# Patient Record
Sex: FEMALE | Race: WHITE | NOT HISPANIC OR LATINO
[De-identification: names, ages, dates, MRNs, and addresses within clinical notes are randomized per-mention and may not be internally consistent; named-entity substitution may affect disease eponyms.]

---

## 2017-03-06 ENCOUNTER — APPOINTMENT (OUTPATIENT)
Dept: INTERNAL MEDICINE | Facility: CLINIC | Age: 62
End: 2017-03-06

## 2017-03-06 VITALS
OXYGEN SATURATION: 97 % | HEIGHT: 60 IN | DIASTOLIC BLOOD PRESSURE: 80 MMHG | TEMPERATURE: 98.3 F | SYSTOLIC BLOOD PRESSURE: 150 MMHG | RESPIRATION RATE: 14 BRPM | HEART RATE: 118 BPM

## 2017-03-06 DIAGNOSIS — H92.02 OTALGIA, LEFT EAR: ICD-10-CM

## 2017-03-06 DIAGNOSIS — R59.1 GENERALIZED ENLARGED LYMPH NODES: ICD-10-CM

## 2017-03-06 DIAGNOSIS — M54.2 CERVICALGIA: ICD-10-CM

## 2017-03-12 LAB
ALBUMIN SERPL ELPH-MCNC: 4.3 G/DL
ALP BLD-CCNC: 74 U/L
ALT SERPL-CCNC: 35 U/L
ANION GAP SERPL CALC-SCNC: 14 MMOL/L
AST SERPL-CCNC: 25 U/L
BASOPHILS # BLD AUTO: 0.05 K/UL
BASOPHILS NFR BLD AUTO: 0.6 %
BILIRUB SERPL-MCNC: 0.3 MG/DL
BUN SERPL-MCNC: 21 MG/DL
CALCIUM SERPL-MCNC: 9.6 MG/DL
CHLORIDE SERPL-SCNC: 98 MMOL/L
CO2 SERPL-SCNC: 25 MMOL/L
CREAT SERPL-MCNC: 0.89 MG/DL
CRP SERPL-MCNC: 0.46 MG/DL
EOSINOPHIL # BLD AUTO: 0.31 K/UL
EOSINOPHIL NFR BLD AUTO: 3.9 %
GLUCOSE SERPL-MCNC: 106 MG/DL
HCT VFR BLD CALC: 40.5 %
HGB BLD-MCNC: 13.8 G/DL
IMM GRANULOCYTES NFR BLD AUTO: 0.1 %
LYMPHOCYTES # BLD AUTO: 2.38 K/UL
LYMPHOCYTES NFR BLD AUTO: 30.2 %
MAN DIFF?: NORMAL
MCHC RBC-ENTMCNC: 33.7 PG
MCHC RBC-ENTMCNC: 34.1 GM/DL
MCV RBC AUTO: 98.8 FL
MONOCYTES # BLD AUTO: 0.57 K/UL
MONOCYTES NFR BLD AUTO: 7.2 %
NEUTROPHILS # BLD AUTO: 4.55 K/UL
NEUTROPHILS NFR BLD AUTO: 58 %
PLATELET # BLD AUTO: 255 K/UL
POTASSIUM SERPL-SCNC: 4.5 MMOL/L
PROT SERPL-MCNC: 7 G/DL
RBC # BLD: 4.1 M/UL
RBC # FLD: 13.4 %
SODIUM SERPL-SCNC: 137 MMOL/L
THYROGLOB AB SERPL-ACNC: <20 IU/ML
THYROPEROXIDASE AB SERPL IA-ACNC: <10 IU/ML
TSH SERPL-ACNC: 2.27 UIU/ML
WBC # FLD AUTO: 7.87 K/UL

## 2017-07-24 ENCOUNTER — INPATIENT (INPATIENT)
Facility: HOSPITAL | Age: 62
LOS: 4 days | Discharge: HOME | DRG: 26 | End: 2017-07-29
Attending: NEUROLOGICAL SURGERY | Admitting: NEUROLOGICAL SURGERY
Payer: COMMERCIAL

## 2017-07-24 ENCOUNTER — EMERGENCY (EMERGENCY)
Facility: HOSPITAL | Age: 62
LOS: 1 days | Discharge: ANOTHER HOSPITAL | End: 2017-07-24
Attending: EMERGENCY MEDICINE | Admitting: EMERGENCY MEDICINE
Payer: COMMERCIAL

## 2017-07-24 ENCOUNTER — APPOINTMENT (OUTPATIENT)
Dept: INTERNAL MEDICINE | Facility: CLINIC | Age: 62
End: 2017-07-24

## 2017-07-24 ENCOUNTER — NON-APPOINTMENT (OUTPATIENT)
Age: 62
End: 2017-07-24

## 2017-07-24 VITALS
OXYGEN SATURATION: 98 % | DIASTOLIC BLOOD PRESSURE: 86 MMHG | SYSTOLIC BLOOD PRESSURE: 138 MMHG | HEART RATE: 82 BPM | HEIGHT: 60 IN | BODY MASS INDEX: 36.32 KG/M2 | WEIGHT: 185 LBS

## 2017-07-24 VITALS
OXYGEN SATURATION: 98 % | DIASTOLIC BLOOD PRESSURE: 91 MMHG | HEART RATE: 70 BPM | SYSTOLIC BLOOD PRESSURE: 173 MMHG | TEMPERATURE: 99 F | RESPIRATION RATE: 18 BRPM

## 2017-07-24 VITALS
SYSTOLIC BLOOD PRESSURE: 180 MMHG | RESPIRATION RATE: 16 BRPM | DIASTOLIC BLOOD PRESSURE: 73 MMHG | WEIGHT: 184.97 LBS | TEMPERATURE: 98 F | OXYGEN SATURATION: 96 % | HEART RATE: 71 BPM

## 2017-07-24 VITALS
DIASTOLIC BLOOD PRESSURE: 97 MMHG | OXYGEN SATURATION: 97 % | RESPIRATION RATE: 20 BRPM | TEMPERATURE: 98 F | HEART RATE: 77 BPM | SYSTOLIC BLOOD PRESSURE: 143 MMHG

## 2017-07-24 DIAGNOSIS — G93.9 DISORDER OF BRAIN, UNSPECIFIED: ICD-10-CM

## 2017-07-24 DIAGNOSIS — R41.0 DISORIENTATION, UNSPECIFIED: ICD-10-CM

## 2017-07-24 DIAGNOSIS — J45.909 UNSPECIFIED ASTHMA, UNCOMPLICATED: ICD-10-CM

## 2017-07-24 DIAGNOSIS — Z98.891 HISTORY OF UTERINE SCAR FROM PREVIOUS SURGERY: Chronic | ICD-10-CM

## 2017-07-24 DIAGNOSIS — F32.9 MAJOR DEPRESSIVE DISORDER, SINGLE EPISODE, UNSPECIFIED: ICD-10-CM

## 2017-07-24 DIAGNOSIS — R26.0 ATAXIC GAIT: ICD-10-CM

## 2017-07-24 DIAGNOSIS — E66.9 OBESITY, UNSPECIFIED: ICD-10-CM

## 2017-07-24 DIAGNOSIS — R26.89 OTHER ABNORMALITIES OF GAIT AND MOBILITY: ICD-10-CM

## 2017-07-24 DIAGNOSIS — Z87.891 PERSONAL HISTORY OF NICOTINE DEPENDENCE: ICD-10-CM

## 2017-07-24 LAB
ALBUMIN SERPL ELPH-MCNC: 3.9 G/DL — SIGNIFICANT CHANGE UP (ref 3.3–5)
ALBUMIN SERPL ELPH-MCNC: 4.3 G/DL — SIGNIFICANT CHANGE UP (ref 3.3–5)
ALP SERPL-CCNC: 73 U/L — SIGNIFICANT CHANGE UP (ref 40–120)
ALP SERPL-CCNC: 91 U/L — SIGNIFICANT CHANGE UP (ref 40–120)
ALT FLD-CCNC: 32 U/L RC — SIGNIFICANT CHANGE UP (ref 10–45)
ALT FLD-CCNC: 37 U/L — SIGNIFICANT CHANGE UP (ref 12–78)
ANION GAP SERPL CALC-SCNC: 18 MMOL/L — HIGH (ref 5–17)
ANION GAP SERPL CALC-SCNC: 5 MMOL/L — SIGNIFICANT CHANGE UP (ref 5–17)
APTT BLD: 27.4 SEC — LOW (ref 27.5–37.4)
APTT BLD: 28.4 SEC — SIGNIFICANT CHANGE UP (ref 27.5–37.4)
AST SERPL-CCNC: 20 U/L — SIGNIFICANT CHANGE UP (ref 15–37)
AST SERPL-CCNC: 21 U/L — SIGNIFICANT CHANGE UP (ref 10–40)
BASOPHILS # BLD AUTO: 0 K/UL — SIGNIFICANT CHANGE UP (ref 0–0.2)
BASOPHILS # BLD AUTO: 0.1 K/UL — SIGNIFICANT CHANGE UP (ref 0–0.2)
BASOPHILS NFR BLD AUTO: 0.5 % — SIGNIFICANT CHANGE UP (ref 0–2)
BASOPHILS NFR BLD AUTO: 1.4 % — SIGNIFICANT CHANGE UP (ref 0–2)
BILIRUB SERPL-MCNC: 0.3 MG/DL — SIGNIFICANT CHANGE UP (ref 0.2–1.2)
BILIRUB SERPL-MCNC: 0.4 MG/DL — SIGNIFICANT CHANGE UP (ref 0.2–1.2)
BLD GP AB SCN SERPL QL: NEGATIVE — SIGNIFICANT CHANGE UP
BLD GP AB SCN SERPL QL: SIGNIFICANT CHANGE UP
BUN SERPL-MCNC: 20 MG/DL — SIGNIFICANT CHANGE UP (ref 7–23)
BUN SERPL-MCNC: 23 MG/DL — SIGNIFICANT CHANGE UP (ref 7–23)
CALCIUM SERPL-MCNC: 8.6 MG/DL — SIGNIFICANT CHANGE UP (ref 8.5–10.1)
CALCIUM SERPL-MCNC: 9.3 MG/DL — SIGNIFICANT CHANGE UP (ref 8.4–10.5)
CHLORIDE SERPL-SCNC: 103 MMOL/L — SIGNIFICANT CHANGE UP (ref 96–108)
CHLORIDE SERPL-SCNC: 99 MMOL/L — SIGNIFICANT CHANGE UP (ref 96–108)
CO2 SERPL-SCNC: 22 MMOL/L — SIGNIFICANT CHANGE UP (ref 22–31)
CO2 SERPL-SCNC: 29 MMOL/L — SIGNIFICANT CHANGE UP (ref 22–31)
CREAT SERPL-MCNC: 0.59 MG/DL — SIGNIFICANT CHANGE UP (ref 0.5–1.3)
CREAT SERPL-MCNC: 0.6 MG/DL — SIGNIFICANT CHANGE UP (ref 0.5–1.3)
EOSINOPHIL # BLD AUTO: 0.2 K/UL — SIGNIFICANT CHANGE UP (ref 0–0.5)
EOSINOPHIL # BLD AUTO: 0.2 K/UL — SIGNIFICANT CHANGE UP (ref 0–0.5)
EOSINOPHIL NFR BLD AUTO: 1.9 % — SIGNIFICANT CHANGE UP (ref 0–6)
EOSINOPHIL NFR BLD AUTO: 3.3 % — SIGNIFICANT CHANGE UP (ref 0–6)
GLUCOSE SERPL-MCNC: 121 MG/DL — HIGH (ref 70–99)
GLUCOSE SERPL-MCNC: 96 MG/DL — SIGNIFICANT CHANGE UP (ref 70–99)
HCT VFR BLD CALC: 39.5 % — SIGNIFICANT CHANGE UP (ref 34.5–45)
HCT VFR BLD CALC: 39.7 % — SIGNIFICANT CHANGE UP (ref 34.5–45)
HGB BLD-MCNC: 13.6 G/DL — SIGNIFICANT CHANGE UP (ref 11.5–15.5)
HGB BLD-MCNC: 13.6 G/DL — SIGNIFICANT CHANGE UP (ref 11.5–15.5)
INR BLD: 0.99 RATIO — SIGNIFICANT CHANGE UP (ref 0.88–1.16)
INR BLD: 1 RATIO — SIGNIFICANT CHANGE UP (ref 0.88–1.16)
LYMPHOCYTES # BLD AUTO: 1.1 K/UL — SIGNIFICANT CHANGE UP (ref 1–3.3)
LYMPHOCYTES # BLD AUTO: 12.4 % — LOW (ref 13–44)
LYMPHOCYTES # BLD AUTO: 2.1 K/UL — SIGNIFICANT CHANGE UP (ref 1–3.3)
LYMPHOCYTES # BLD AUTO: 27.9 % — SIGNIFICANT CHANGE UP (ref 13–44)
MAGNESIUM SERPL-MCNC: 2.2 MG/DL — SIGNIFICANT CHANGE UP (ref 1.6–2.6)
MCHC RBC-ENTMCNC: 34.2 GM/DL — SIGNIFICANT CHANGE UP (ref 32–36)
MCHC RBC-ENTMCNC: 34.3 PG — HIGH (ref 27–34)
MCHC RBC-ENTMCNC: 34.5 GM/DL — SIGNIFICANT CHANGE UP (ref 32–36)
MCHC RBC-ENTMCNC: 35.1 PG — HIGH (ref 27–34)
MCV RBC AUTO: 100.4 FL — HIGH (ref 80–100)
MCV RBC AUTO: 102 FL — HIGH (ref 80–100)
MONOCYTES # BLD AUTO: 0.3 K/UL — SIGNIFICANT CHANGE UP (ref 0–0.9)
MONOCYTES # BLD AUTO: 0.6 K/UL — SIGNIFICANT CHANGE UP (ref 0–0.9)
MONOCYTES NFR BLD AUTO: 3.1 % — SIGNIFICANT CHANGE UP (ref 2–14)
MONOCYTES NFR BLD AUTO: 7.5 % — SIGNIFICANT CHANGE UP (ref 1–9)
NEUTROPHILS # BLD AUTO: 4.4 K/UL — SIGNIFICANT CHANGE UP (ref 1.8–7.4)
NEUTROPHILS # BLD AUTO: 7.5 K/UL — HIGH (ref 1.8–7.4)
NEUTROPHILS NFR BLD AUTO: 59.8 % — SIGNIFICANT CHANGE UP (ref 43–77)
NEUTROPHILS NFR BLD AUTO: 82.1 % — HIGH (ref 43–77)
PHOSPHATE SERPL-MCNC: 4 MG/DL — SIGNIFICANT CHANGE UP (ref 2.5–4.5)
PLATELET # BLD AUTO: 236 K/UL — SIGNIFICANT CHANGE UP (ref 150–400)
PLATELET # BLD AUTO: 238 K/UL — SIGNIFICANT CHANGE UP (ref 150–400)
POTASSIUM SERPL-MCNC: 3.9 MMOL/L — SIGNIFICANT CHANGE UP (ref 3.5–5.3)
POTASSIUM SERPL-MCNC: 4 MMOL/L — SIGNIFICANT CHANGE UP (ref 3.5–5.3)
POTASSIUM SERPL-SCNC: 3.9 MMOL/L — SIGNIFICANT CHANGE UP (ref 3.5–5.3)
POTASSIUM SERPL-SCNC: 4 MMOL/L — SIGNIFICANT CHANGE UP (ref 3.5–5.3)
PROT SERPL-MCNC: 7.2 G/DL — SIGNIFICANT CHANGE UP (ref 6–8.3)
PROT SERPL-MCNC: 7.3 G/DL — SIGNIFICANT CHANGE UP (ref 6–8.3)
PROTHROM AB SERPL-ACNC: 10.8 SEC — SIGNIFICANT CHANGE UP (ref 9.8–12.7)
PROTHROM AB SERPL-ACNC: 10.9 SEC — SIGNIFICANT CHANGE UP (ref 9.8–12.7)
RBC # BLD: 3.88 M/UL — SIGNIFICANT CHANGE UP (ref 3.8–5.2)
RBC # BLD: 3.95 M/UL — SIGNIFICANT CHANGE UP (ref 3.8–5.2)
RBC # FLD: 11.9 % — SIGNIFICANT CHANGE UP (ref 10.3–14.5)
RBC # FLD: 12.1 % — SIGNIFICANT CHANGE UP (ref 10.3–14.5)
RH IG SCN BLD-IMP: POSITIVE — SIGNIFICANT CHANGE UP
RH IG SCN BLD-IMP: POSITIVE — SIGNIFICANT CHANGE UP
SODIUM SERPL-SCNC: 137 MMOL/L — SIGNIFICANT CHANGE UP (ref 135–145)
SODIUM SERPL-SCNC: 139 MMOL/L — SIGNIFICANT CHANGE UP (ref 135–145)
TROPONIN I SERPL-MCNC: <.015 NG/ML — SIGNIFICANT CHANGE UP (ref 0.01–0.04)
TSH SERPL-MCNC: 3.37 UIU/ML — SIGNIFICANT CHANGE UP (ref 0.36–3.74)
WBC # BLD: 7.4 K/UL — SIGNIFICANT CHANGE UP (ref 3.8–10.5)
WBC # BLD: 9.1 K/UL — SIGNIFICANT CHANGE UP (ref 3.8–10.5)
WBC # FLD AUTO: 7.4 K/UL — SIGNIFICANT CHANGE UP (ref 3.8–10.5)
WBC # FLD AUTO: 9.1 K/UL — SIGNIFICANT CHANGE UP (ref 3.8–10.5)

## 2017-07-24 PROCEDURE — 70450 CT HEAD/BRAIN W/O DYE: CPT | Mod: 26

## 2017-07-24 PROCEDURE — 93005 ELECTROCARDIOGRAM TRACING: CPT

## 2017-07-24 PROCEDURE — 93010 ELECTROCARDIOGRAM REPORT: CPT | Mod: 77

## 2017-07-24 PROCEDURE — 85610 PROTHROMBIN TIME: CPT

## 2017-07-24 PROCEDURE — 96375 TX/PRO/DX INJ NEW DRUG ADDON: CPT

## 2017-07-24 PROCEDURE — 74177 CT ABD & PELVIS W/CONTRAST: CPT | Mod: 26

## 2017-07-24 PROCEDURE — 85730 THROMBOPLASTIN TIME PARTIAL: CPT

## 2017-07-24 PROCEDURE — 86901 BLOOD TYPING SEROLOGIC RH(D): CPT

## 2017-07-24 PROCEDURE — 86900 BLOOD TYPING SEROLOGIC ABO: CPT

## 2017-07-24 PROCEDURE — 84484 ASSAY OF TROPONIN QUANT: CPT

## 2017-07-24 PROCEDURE — 71260 CT THORAX DX C+: CPT | Mod: 26

## 2017-07-24 PROCEDURE — 70450 CT HEAD/BRAIN W/O DYE: CPT

## 2017-07-24 PROCEDURE — 80053 COMPREHEN METABOLIC PANEL: CPT

## 2017-07-24 PROCEDURE — 36415 COLL VENOUS BLD VENIPUNCTURE: CPT

## 2017-07-24 PROCEDURE — 93010 ELECTROCARDIOGRAM REPORT: CPT | Mod: 59

## 2017-07-24 PROCEDURE — 84443 ASSAY THYROID STIM HORMONE: CPT

## 2017-07-24 PROCEDURE — 85027 COMPLETE CBC AUTOMATED: CPT

## 2017-07-24 PROCEDURE — 84100 ASSAY OF PHOSPHORUS: CPT

## 2017-07-24 PROCEDURE — 83735 ASSAY OF MAGNESIUM: CPT

## 2017-07-24 PROCEDURE — 99285 EMERGENCY DEPT VISIT HI MDM: CPT

## 2017-07-24 PROCEDURE — 96374 THER/PROPH/DIAG INJ IV PUSH: CPT

## 2017-07-24 PROCEDURE — 99285 EMERGENCY DEPT VISIT HI MDM: CPT | Mod: 25

## 2017-07-24 PROCEDURE — 86850 RBC ANTIBODY SCREEN: CPT

## 2017-07-24 RX ORDER — LEVETIRACETAM 250 MG/1
500 TABLET, FILM COATED ORAL ONCE
Qty: 0 | Refills: 0 | Status: COMPLETED | OUTPATIENT
Start: 2017-07-24 | End: 2017-07-24

## 2017-07-24 RX ORDER — DEXAMETHASONE 0.5 MG/5ML
10 ELIXIR ORAL ONCE
Qty: 0 | Refills: 0 | Status: COMPLETED | OUTPATIENT
Start: 2017-07-24 | End: 2017-07-24

## 2017-07-24 RX ORDER — ACETAMINOPHEN 500 MG
650 TABLET ORAL EVERY 6 HOURS
Qty: 0 | Refills: 0 | Status: DISCONTINUED | OUTPATIENT
Start: 2017-07-24 | End: 2017-07-29

## 2017-07-24 RX ORDER — LEVETIRACETAM 250 MG/1
500 TABLET, FILM COATED ORAL EVERY 12 HOURS
Qty: 0 | Refills: 0 | Status: DISCONTINUED | OUTPATIENT
Start: 2017-07-24 | End: 2017-07-27

## 2017-07-24 RX ORDER — FLUOCINOLONE ACETONIDE 0.1 MG/G
0.01 CREAM TOPICAL
Qty: 60 | Refills: 0 | Status: DISCONTINUED | COMMUNITY
Start: 2017-02-01 | End: 2017-07-24

## 2017-07-24 RX ORDER — AMOXICILLIN AND CLAVULANATE POTASSIUM 875; 125 MG/1; MG/1
875-125 TABLET, COATED ORAL
Qty: 20 | Refills: 0 | Status: DISCONTINUED | COMMUNITY
Start: 2017-03-06 | End: 2017-07-24

## 2017-07-24 RX ORDER — HYDROCODONE BITARTRATE AND ACETAMINOPHEN 7.5; 3 MG/1; MG/1
7.5-3 TABLET ORAL EVERY 6 HOURS
Qty: 12 | Refills: 0 | Status: DISCONTINUED | COMMUNITY
Start: 2017-03-06 | End: 2017-07-24

## 2017-07-24 RX ORDER — FLUOXETINE HCL 10 MG
10 CAPSULE ORAL DAILY
Qty: 0 | Refills: 0 | Status: DISCONTINUED | OUTPATIENT
Start: 2017-07-24 | End: 2017-07-26

## 2017-07-24 RX ORDER — ONDANSETRON 8 MG/1
4 TABLET, FILM COATED ORAL EVERY 6 HOURS
Qty: 0 | Refills: 0 | Status: DISCONTINUED | OUTPATIENT
Start: 2017-07-24 | End: 2017-07-27

## 2017-07-24 RX ORDER — SODIUM SULFATE, POTASSIUM SULFATE, MAGNESIUM SULFATE 17.5; 3.13; 1.6 G/ML; G/ML; G/ML
17.5-3.13-1.6 SOLUTION, CONCENTRATE ORAL
Qty: 354 | Refills: 0 | Status: DISCONTINUED | COMMUNITY
Start: 2017-05-02

## 2017-07-24 RX ORDER — DOCUSATE SODIUM 100 MG
100 CAPSULE ORAL THREE TIMES A DAY
Qty: 0 | Refills: 0 | Status: DISCONTINUED | OUTPATIENT
Start: 2017-07-24 | End: 2017-07-27

## 2017-07-24 RX ORDER — BUDESONIDE AND FORMOTEROL FUMARATE DIHYDRATE 160; 4.5 UG/1; UG/1
2 AEROSOL RESPIRATORY (INHALATION)
Qty: 0 | Refills: 0 | Status: DISCONTINUED | OUTPATIENT
Start: 2017-07-24 | End: 2017-07-27

## 2017-07-24 RX ORDER — SENNA PLUS 8.6 MG/1
2 TABLET ORAL AT BEDTIME
Qty: 0 | Refills: 0 | Status: DISCONTINUED | OUTPATIENT
Start: 2017-07-24 | End: 2017-07-29

## 2017-07-24 RX ADMIN — Medication 102 MILLIGRAM(S): at 20:20

## 2017-07-24 RX ADMIN — LEVETIRACETAM 420 MILLIGRAM(S): 250 TABLET, FILM COATED ORAL at 20:30

## 2017-07-24 NOTE — H&P ADULT - NSHPPHYSICALEXAM_GEN_ALL_CORE
AOx3, FC, PERRL, EOMI, V1-3 intact, no facial, palate jose symmetric, tongue midline, shrug 5/5  5/5 throughout, no drift  SILT  No clonus or babinski   Mild left dysmetria, bilateral coarse tremor  Stereognosis intact  No left-right confusion  No agraphia or alexia  no finger agnosia

## 2017-07-24 NOTE — ED PROVIDER NOTE - OBJECTIVE STATEMENT
60yo F with 6 weeks of trouble with depth perception, taking steps that arent there, trouble with keyboard, feeling off balance. no weakness. no vision changes. no HA. no fever/chills. saw PCP today who sent to ED b/c heart rate was irregular. at [place] found to have brain mass vs abscess. no h/o travel, no spinal procedures.     PMH - asthma

## 2017-07-24 NOTE — ED ADULT NURSE NOTE - OBJECTIVE STATEMENT
Pt is 6 weeks S/P colonoscopy. Pt reports vague neuro complaints including issues with depth perception, hand-eye coordination, forgetfulness & balance which has not worsened but also has not improved. Pt reports symptoms began after procedure, is concerned they may be R/T anesthesia

## 2017-07-24 NOTE — ED PROVIDER NOTE - ATTENDING CONTRIBUTION TO CARE
ATTENDING MD THRASHER.  Agree with above.  Pt is a 61 yr old female who presented to outpt office today with complaint of increasing spacial sxs concerning for intracranial mass.  Seen at [facility] and found to have intracranial mass of unclear etiology.  Hx of tobacco use x 20 yrs quit in 1993.  No other CA known.  Pt is well appearing and otherwise asymptomatic.  Hx of colonoscopy ~10 wks ago without acutely actionable findings.  Planned admission to [facility] for continued management and further imaging.  Stable for admission to [facility].

## 2017-07-24 NOTE — H&P ADULT - HISTORY OF PRESENT ILLNESS
61 RHF with no history of cancer p/w "spatial disorientation," "difficulty walking" and forgetting basic tasks like typing, progressive over 6 weeks. She went to  where CTH shows approximately 3x4cm right parietal cystic lesion. She was given 10mg decadron and 500 mg Keppra and transferred to [facility] for further evaluation. On arrival, patient states that over the past several weeks she has experienced forgetting how to fold clothes and type on the keyboard at times, causing her some concern. She states that her short term memory has been progressively worse and she has developed bilateral tremor for the past year. She denies fevers, chills, weight loss/gain, history of cancer, weakness, numbness, tingling, seizures.

## 2017-07-24 NOTE — ED PROVIDER NOTE - MEDICAL DECISION MAKING DETAILS
Brain mass vs abscess, no infectious sx or risk factor for abscess, no distress, receivedf 10mg decadron and 500mg keppra already. will repeat labs, CT C/A/P, MRI brain TBA

## 2017-07-24 NOTE — H&P ADULT - ATTENDING COMMENTS
Agree with resident note. Patient personally seen and examined. All current imaging studies reviewed.  MRI c/w a large R parietal cystic lesion abutted the dura, the solid component enhances.  CT C/A/P: neg for ??metastatic disease  Pt is a/a/o and has minimal neuro deficit.  Plan : R craniotomy. discussed at length with pt and separately with . Will obtain medical clearance.  BILLY

## 2017-07-24 NOTE — ED PROVIDER NOTE - CHPI ED SYMPTOMS NEG
no numbness/no fever/no change in level of consciousness/no vomiting/no blurred vision/no confusion/no dizziness/no nausea/no weakness/no loss of consciousness

## 2017-07-24 NOTE — ED PROVIDER NOTE - PROGRESS NOTE DETAILS
cardiology dr monroy to [facility] dr cordova paged re neuro dr cordova paged re neuro  in the meantime dr Peraza paged pedro Warren, if ct is neg ok to dc as sx chronic needs out patient workup radiology called r pareital with mass effect and edema 3.8 cm? abscess vs neoplasm  [facility] transfer center called-  DR Crews is accepting neurosurgeon  er attending dr cope radiology called r pareital with mass effect and edema 3.8 cm? abscess vs neoplasm  [facility] transfer center called-  DR Crews is accepting neurosurgeon  er attending dr Brooks

## 2017-07-24 NOTE — ED PROVIDER NOTE - CHIEF COMPLAINT
The patient is a 61y Female complaining of see chief complaint quote. The patient is a 61y Female complaining of being unsteady

## 2017-07-24 NOTE — ED PROVIDER NOTE - NS ED ROS FT
ROS: no CP/SOB. no cough. no n/v/d/c. no abd pain. no rash. no bleeding. no urinary complaints. no weakness. no vision changes. no HA. no neck/back pain. no extremity swelling.

## 2017-07-24 NOTE — ED PROVIDER NOTE - PHYSICAL EXAMINATION
Gen: NAD, AOx3  Head: NCAT  HEENT: PERRL, oral mucosa moist, normal conjunctiva, neck supple  Lung: CTAB, no respiratory distress  CV: rrr, no murmur, Normal perfusion  Abd: soft, NTND, obese  MSK: No edema, no visible deformities  Neuro: +dysmetria on Lt UE, CN II-XII intact, no focal weakness or sensory deficits  Skin: No rash   Psych: normal affect

## 2017-07-24 NOTE — ED PROVIDER NOTE - OBJECTIVE STATEMENT
Pt is a 60 yo f who has who has hx of asthma sp l4/5 lami sp bl shoulder surgery sp r cyst excision of the wrist and c section.  she was in her usual state of health until 6 weeks ago, approx 1 week after she underwent a screening colonoscopy on 6/8.  At that time she began to have problems with coordination, bumping into things depth perception.  she thought sx would get better but did not so she saw pmd today who sent her to er for eval and cardiology consultation with slightly irregular ekg  pmd dr cordova  former smoker no drug allergies

## 2017-07-24 NOTE — ED ADULT NURSE NOTE - OBJECTIVE STATEMENT
Patient was transferred by EMS from [place]. Patient had six weeks of unsteadiness and difficulty ambulating. Diagnosed with a brain mass transferred for neurosurgery consult.

## 2017-07-24 NOTE — CONSULT NOTE ADULT - SUBJECTIVE AND OBJECTIVE BOX
[facility] - Pamela King, Nish, Damian, Sharath Casillas  Office Number: [phone]    Initial Consult Note    CHIEF COMPLAINT: Patient is a 61y old  Female who presents with a chief complaint of an abnormal EKG    HPI:  This is a 61 year old woman with a history of obesity, asthma, chronic back pain, depression who was sent to the ED by her PMD for an abnormal EKG.  She has been having visual changes with difficulties in spacial relationship and depth perception.  She sometimes tries to step on a step that is not there, or can not see her keyboard.  She was seen by her PMD, and her EKG was concerning for atrial fibrillation.  On closer review, her EKG shows normal sinus rhythm with PACS.  She denies chest pain, dyspnea, PND, orthopnea, LE swelling, dizziness, lightheadedness, or syncope.  She has never seen a cardiologist. She has not yet had her visual changes and neurological symptoms evaluated.        PAST MEDICAL & SURGICAL HISTORY:  Above.        SOCIAL HISTORY:  No tobacco, ethanol, or drug abuse.    FAMILY HISTORY:    No family history of acute MI or sudden cardiac death.    MEDICATIONS  (STANDING):  Advair, flucinolone, Flouxetine      Allergies    No Known Drug Allergies  shellfish (Rash)    REVIEW OF SYSTEMS:  All other review of systems is negative unless indicated above    VITAL SIGNS:   Vital Signs Last 24 Hrs  T(C): 36.9 (24 Jul 2017 17:55), Max: 36.9 (24 Jul 2017 17:55)  T(F): 98.4 (24 Jul 2017 17:55), Max: 98.4 (24 Jul 2017 17:55)  HR: 71 (24 Jul 2017 17:55) (71 - 71)  BP: 180/73 (24 Jul 2017 17:55) (180/73 - 180/73)  BP(mean): --  RR: 16 (24 Jul 2017 17:55) (16 - 16)  SpO2: 96% (24 Jul 2017 17:55) (96% - 96%)    I&O's Summary      On Exam:    Constitutional: NAD, alert and oriented x 3  Lungs:  Non-labored, breath sounds are clear bilaterally, No wheezing, rales or rhonchi  Cardiovascular: RRR.  S1 and S2 positive.  No murmurs, rubs, gallops or clicks.  Extrasystoles  Gastrointestinal: Bowel Sounds present, soft, nontender.   Lymph: No peripheral edema. No cervical lymphadenopathy.  Neurological: Alert, no focal deficits  Skin: No rashes or ulcers   Psych:  Mood & affect appropriate.    LABS: All Labs Reviewed:    EKG:  Sinus rhythm with frequent PACs.    Assessment/Plan:  61y Female with the above history with visual and spatial orientation changes, an EKG that shows sinus rhythm with PACs:    - Patient can be worked up as an outpatient for this  - She needs a 24 hour Holter, an echocardiogram, and a carotid Doppler  - I gave her the contact information to schedule these studies in my office  - Neurological work up per the ED team

## 2017-07-24 NOTE — H&P ADULT - ASSESSMENT
61 RHF with spatial disorientation, difficulty walking and memory problems for 6 weeks found on CTH to have rigth parietal cystic lesion. No history of cancer but will obtain CT CAP to evaluate for metastatic disease. Patient will need MRI brain w/wo to furthr characterize lesion. Keppra 500 BID for seizure prophylaxis, will hold decadron for now. Will obtain medical evaluation as patient may need surgical intervention, pending results of MRI and CT CAP. Discussed results of CT and further management with patient and .

## 2017-07-25 DIAGNOSIS — I49.9 CARDIAC ARRHYTHMIA, UNSPECIFIED: ICD-10-CM

## 2017-07-25 DIAGNOSIS — E87.1 HYPO-OSMOLALITY AND HYPONATREMIA: ICD-10-CM

## 2017-07-25 DIAGNOSIS — J45.909 UNSPECIFIED ASTHMA, UNCOMPLICATED: ICD-10-CM

## 2017-07-25 DIAGNOSIS — Z29.9 ENCOUNTER FOR PROPHYLACTIC MEASURES, UNSPECIFIED: ICD-10-CM

## 2017-07-25 DIAGNOSIS — R91.1 SOLITARY PULMONARY NODULE: ICD-10-CM

## 2017-07-25 DIAGNOSIS — F32.9 MAJOR DEPRESSIVE DISORDER, SINGLE EPISODE, UNSPECIFIED: ICD-10-CM

## 2017-07-25 DIAGNOSIS — G93.9 DISORDER OF BRAIN, UNSPECIFIED: ICD-10-CM

## 2017-07-25 LAB
ANION GAP SERPL CALC-SCNC: 18 MMOL/L — HIGH (ref 5–17)
BUN SERPL-MCNC: 18 MG/DL — SIGNIFICANT CHANGE UP (ref 7–23)
CALCIUM SERPL-MCNC: 9.2 MG/DL — SIGNIFICANT CHANGE UP (ref 8.4–10.5)
CHLORIDE SERPL-SCNC: 96 MMOL/L — SIGNIFICANT CHANGE UP (ref 96–108)
CO2 SERPL-SCNC: 20 MMOL/L — LOW (ref 22–31)
CREAT SERPL-MCNC: 0.65 MG/DL — SIGNIFICANT CHANGE UP (ref 0.5–1.3)
GLUCOSE SERPL-MCNC: 190 MG/DL — HIGH (ref 70–99)
HCT VFR BLD CALC: 39 % — SIGNIFICANT CHANGE UP (ref 34.5–45)
HGB BLD-MCNC: 13.4 G/DL — SIGNIFICANT CHANGE UP (ref 11.5–15.5)
MCHC RBC-ENTMCNC: 33.3 PG — SIGNIFICANT CHANGE UP (ref 27–34)
MCHC RBC-ENTMCNC: 34.4 GM/DL — SIGNIFICANT CHANGE UP (ref 32–36)
MCV RBC AUTO: 96.8 FL — SIGNIFICANT CHANGE UP (ref 80–100)
PLATELET # BLD AUTO: 272 K/UL — SIGNIFICANT CHANGE UP (ref 150–400)
POTASSIUM SERPL-MCNC: 4.4 MMOL/L — SIGNIFICANT CHANGE UP (ref 3.5–5.3)
POTASSIUM SERPL-SCNC: 4.4 MMOL/L — SIGNIFICANT CHANGE UP (ref 3.5–5.3)
RBC # BLD: 4.03 M/UL — SIGNIFICANT CHANGE UP (ref 3.8–5.2)
RBC # FLD: 13 % — SIGNIFICANT CHANGE UP (ref 10.3–14.5)
SODIUM SERPL-SCNC: 134 MMOL/L — LOW (ref 135–145)
WBC # BLD: 8.4 K/UL — SIGNIFICANT CHANGE UP (ref 3.8–10.5)
WBC # FLD AUTO: 8.4 K/UL — SIGNIFICANT CHANGE UP (ref 3.8–10.5)

## 2017-07-25 PROCEDURE — 99255 IP/OBS CONSLTJ NEW/EST HI 80: CPT

## 2017-07-25 PROCEDURE — 99223 1ST HOSP IP/OBS HIGH 75: CPT

## 2017-07-25 RX ADMIN — LEVETIRACETAM 500 MILLIGRAM(S): 250 TABLET, FILM COATED ORAL at 18:13

## 2017-07-25 RX ADMIN — Medication 100 MILLIGRAM(S): at 21:59

## 2017-07-25 RX ADMIN — LEVETIRACETAM 500 MILLIGRAM(S): 250 TABLET, FILM COATED ORAL at 05:09

## 2017-07-25 RX ADMIN — Medication 10 MILLIGRAM(S): at 14:23

## 2017-07-25 NOTE — CONSULT NOTE ADULT - PROBLEM SELECTOR RECOMMENDATION 3
Pt reports she has had palpitations, extra beats for years. P waves on EKG, not afib. No objection to proceed with procedure - monitor BMP, Mg, make sure K > 4, Mg > 2 correct other electrolyte abnormalities

## 2017-07-25 NOTE — CONSULT NOTE ADULT - PROBLEM SELECTOR RECOMMENDATION 5
c/w Prozac - pt is on 40mg daily, unclear why 10mg is ordered - will clarify w/NSGY. Given hyponatremia no objection to reducing dose

## 2017-07-25 NOTE — CONSULT NOTE ADULT - PROBLEM SELECTOR RECOMMENDATION 9
RCRI = 0, METS > 4 no further testing is indicated no objection to proceed with procedure. Pt is low risk for procedure. AEDs, steroids, resection per NSGY. f/u path.

## 2017-07-25 NOTE — PROGRESS NOTE ADULT - SUBJECTIVE AND OBJECTIVE BOX
SUBJECTIVE:     OVERNIGHT EVENTS:     Vital Signs Last 24 Hrs  T(C): 36.8 (25 Jul 2017 08:24), Max: 37.2 (24 Jul 2017 20:30)  T(F): 98.2 (25 Jul 2017 08:24), Max: 99 (24 Jul 2017 20:30)  HR: 72 (25 Jul 2017 08:24) (70 - 89)  BP: 148/80 (25 Jul 2017 08:24) (143/97 - 180/73)  BP(mean): --  RR: 18 (25 Jul 2017 08:24) (16 - 20)  SpO2: 94% (25 Jul 2017 08:24) (94% - 98%)  IVF: [X ] IVL   DIET: [ X] Regular   PCA: [ ] YES [ X] NO     DRAINS: n/a    PHYSICAL EXAM:    General: No Acute Distress     Neurological: AOx3, FC, PERRL, EOMI, V1-3 intact, no facial, tongue midline, GRANADOS 5/5 throughout, no drift. SILT. No clonus or babinski. Mild left dysmetria, bilateral coarse tremors.  Stereognosis intact.     Pulmonary: Clear to Auscultation, No Rales, No Rhonchi, No Wheezes     Cardiovascular: S1, S2, Regular Rate and Rhythm     Gastrointestinal: Soft, Nontender, Nondistended     Extremities: No calf tenderness     Incision: n/a    LABS:                        13.4   8.40  )-----------( 272      ( 25 Jul 2017 07:41 )             39.0    07-25    134<L>  |  96  |  18  ----------------------------<  190<H>  4.4   |  20<L>  |  0.65    Ca    9.2      25 Jul 2017 07:35  Phos  4.0     07-24  Mg     2.2     07-24    TPro  7.3  /  Alb  4.3  /  TBili  0.3  /  DBili  x   /  AST  21  /  ALT  32  /  AlkPhos  73  07-24  PT/INR - ( 24 Jul 2017 22:27 )   PT: 10.8 sec;   INR: 0.99 ratio         PTT - ( 24 Jul 2017 22:27 )  PTT:28.4 sec      IMAGING: CT Chest, Abdomen and Pelvis w/ Oral Cont and w/ IV Cont (07.24.17 @ 23:54) >  No primary or metastatic disease.    4 mm nonspecific left lung nodule, and the presence of risk factors 6-12   months follow-up low dose CT may be considered.    MEDICATIONS  (STANDING):  FLUoxetine 10 milliGRAM(s) Oral daily  levETIRAcetam 500 milliGRAM(s) Oral every 12 hours  docusate sodium 100 milliGRAM(s) Oral three times a day  buDESOnide  80 MICROgram(s)/formoterol 4.5 MICROgram(s) Inhaler 2 Puff(s) Inhalation two times a day    MEDICATIONS  (PRN):  acetaminophen   Tablet 650 milliGRAM(s) Oral every 6 hours PRN For Temp greater than 38 C (100.4 F)  acetaminophen   Tablet. 650 milliGRAM(s) Oral every 6 hours PRN Mild Pain (1 - 3)  ondansetron Injectable 4 milliGRAM(s) IV Push every 6 hours PRN Nausea and/or Vomiting  senna 2 Tablet(s) Oral at bedtime PRN Constipation SUBJECTIVE: Comfortable, NAD    OVERNIGHT EVENTS: none    Vital Signs Last 24 Hrs  T(C): 36.8 (25 Jul 2017 08:24), Max: 37.2 (24 Jul 2017 20:30)  T(F): 98.2 (25 Jul 2017 08:24), Max: 99 (24 Jul 2017 20:30)  HR: 72 (25 Jul 2017 08:24) (70 - 89)  BP: 148/80 (25 Jul 2017 08:24) (143/97 - 180/73)  BP(mean): --  RR: 18 (25 Jul 2017 08:24) (16 - 20)  SpO2: 94% (25 Jul 2017 08:24) (94% - 98%)  IVF: [X ] IVL   DIET: [ X] Regular   PCA: [ ] YES [ X] NO     DRAINS: n/a    PHYSICAL EXAM:    General: No Acute Distress     Neurological: AOx3, FC, PERRL, EOMI, V1-3 intact, no facial, tongue midline, GRANADOS 5/5 throughout, no drift. SILT. No clonus or babinski. Mild left dysmetria, bilateral UE with mild coarse tremors.  Stereognosis intact.     Pulmonary: Clear to Auscultation, No Rales, No Rhonchi, No Wheezes     Cardiovascular: S1, S2, Regular Rate and Rhythm     Gastrointestinal: Soft, Nontender, Nondistended     Extremities: No calf tenderness     Incision: n/a    LABS:                        13.4   8.40  )-----------( 272      ( 25 Jul 2017 07:41 )             39.0    07-25    134<L>  |  96  |  18  ----------------------------<  190<H>  4.4   |  20<L>  |  0.65    Ca    9.2      25 Jul 2017 07:35  Phos  4.0     07-24  Mg     2.2     07-24    TPro  7.3  /  Alb  4.3  /  TBili  0.3  /  DBili  x   /  AST  21  /  ALT  32  /  AlkPhos  73  07-24  PT/INR - ( 24 Jul 2017 22:27 )   PT: 10.8 sec;   INR: 0.99 ratio         PTT - ( 24 Jul 2017 22:27 )  PTT:28.4 sec      IMAGING: CT Chest, Abdomen and Pelvis w/ Oral Cont and w/ IV Cont (07.24.17 @ 23:54) >  No primary or metastatic disease.    4 mm nonspecific left lung nodule, and the presence of risk factors 6-12   months follow-up low dose CT may be considered.    MEDICATIONS  (STANDING):  FLUoxetine 10 milliGRAM(s) Oral daily  levETIRAcetam 500 milliGRAM(s) Oral every 12 hours  docusate sodium 100 milliGRAM(s) Oral three times a day  buDESOnide  80 MICROgram(s)/formoterol 4.5 MICROgram(s) Inhaler 2 Puff(s) Inhalation two times a day    MEDICATIONS  (PRN):  acetaminophen   Tablet 650 milliGRAM(s) Oral every 6 hours PRN For Temp greater than 38 C (100.4 F)  acetaminophen   Tablet. 650 milliGRAM(s) Oral every 6 hours PRN Mild Pain (1 - 3)  ondansetron Injectable 4 milliGRAM(s) IV Push every 6 hours PRN Nausea and/or Vomiting  senna 2 Tablet(s) Oral at bedtime PRN Constipation

## 2017-07-25 NOTE — CONSULT NOTE ADULT - PROBLEM SELECTOR RECOMMENDATION 2
Mgmt per NSGY - check urine lytes and osm, may need salt restriction vs hypertonic saline as per NSGY, monitor BMP

## 2017-07-25 NOTE — CONSULT NOTE ADULT - SUBJECTIVE AND OBJECTIVE BOX
Patient is a 61y old  Female who presents with a chief complaint of brain mass transfer from  (2017 21:53)    HPI:  61 RHF with no history of cancer p/w "spatial disorientation," "difficulty walking" and forgetting basic tasks like typing, progressive over 6 weeks. She went to her PMD and was referred to ED after noting abnormal neuro exam and abnormal EKG. She went to  where CTH shows approximately 3x4cm right parietal cystic lesion. She was given 10mg decadron and 500 mg Keppra and transferred to [facility] for further evaluation. On arrival, patient states that over the past several weeks she has experienced forgetting how to fold clothes and type on the keyboard at times, causing her some concern. She states that her short term memory has been progressively worse and she has developed bilateral tremor for the past year. She denies fevers, chills, weight loss/gain, history of cancer, weakness, numbness, tingling, seizures. (2017 21:53)    Function: [X] Independent  [ ] Assistance  [ ] Total care  [ ] Non-ambulatory    Exercise Tolerance - walks unlimited, stairs, gardens, housework - DASI >23 METS > 4  Never had anesthesia reaction  Not on aspirin or other blood thinner - takes occasional NSAID not recently      Allergies    No Known Drug Allergies  shellfish (Rash)  animal dander    Intolerances        HOME MEDICATIONS: [X] Reviewed    MEDICATIONS  (STANDING):  FLUoxetine 10 milliGRAM(s) Oral daily  levETIRAcetam 500 milliGRAM(s) Oral every 12 hours  docusate sodium 100 milliGRAM(s) Oral three times a day  buDESOnide  80 MICROgram(s)/formoterol 4.5 MICROgram(s) Inhaler 2 Puff(s) Inhalation two times a day    MEDICATIONS  (PRN):  acetaminophen   Tablet 650 milliGRAM(s) Oral every 6 hours PRN For Temp greater than 38 C (100.4 F)  acetaminophen   Tablet. 650 milliGRAM(s) Oral every 6 hours PRN Mild Pain (1 - 3)  ondansetron Injectable 4 milliGRAM(s) IV Push every 6 hours PRN Nausea and/or Vomiting  senna 2 Tablet(s) Oral at bedtime PRN Constipation      PAST MEDICAL & SURGICAL HISTORY:  Asthma  S/P   Spinal Fusion  Hand surgery  Rotator cuff  tonsillectomy  [X] Reviewed     SOCIAL HISTORY:  Residence: [ ] [facility]  [ ] SNF  [X] Community  [ ] Substance abuse: none  [ ] Tobacco: former 20PY quit 20 yrs ago  [ ] Alcohol use: none    FAMILY HISTORY:  Breast Ca in Mother    REVIEW OF SYSTEMS:    CONSTITUTIONAL: No fever,   EYES: No eye pain, visual disturbances, or discharge  ENMT:  No difficulty hearing, tinnitus, vertigo; No sinus or throat pain  NECK: No pain or stiffness  RESPIRATORY: No cough, wheezing, chills or hemoptysis; No shortness of breath  CARDIOVASCULAR: No chest pain, palpitations, dizziness, or leg swelling  GASTROINTESTINAL: No abdominal or epigastric pain. No nausea, vomiting, or hematemesis; No diarrhea or constipation. No melena or hematochezia.  GENITOURINARY: No dysuria,  NEUROLOGICAL: No headaches, loss of strength, numbness, or tremors. Discoordination, disorientation, memory loss as above  SKIN: No itching, burning, rashes, or lesions   LYMPH NODES: Laryngeal lymph node has been bothering her, was ultrasounded this year not very large  MUSCULOSKELETAL: No muscle or back pain, h/o spinal fusion  PSYCHIATRIC: Depression on Prozac  [X ] All other ROS negative  [  ] Unable to obtain due to poor mental status    Vital Signs Last 24 Hrs  T(C): 36.8 (2017 16:37), Max: 37.2 (2017 20:30)  T(F): 98.3 (2017 16:37), Max: 99 (2017 20:30)  HR: 73 (2017 16:37) (70 - 89)  BP: 134/87 (2017 16:37) (122/77 - 173/91)  BP(mean): --  RR: 16 (2017 16:37) (16 - 20)  SpO2: 96% (2017 16:37) (94% - 98%)    PHYSICAL EXAM:    GENERAL: NAD, well-groomed, well-developed  EYES: EOMI, PERRLA, conjunctiva and sclera clear  ENMT: Moist mucous membranes  NECK: Supple, No JVD  RESPIRATORY: Clear to auscultation bilaterally; No rales, rhonchi, wheezing, or rubs  CARDIOVASCULAR: Regular rate and rhythm; No murmurs, rubs, or gallops  GASTROINTESTINAL: Soft, Nontender, Nondistended; Bowel sounds present  EXTREMITIES:  2+ Peripheral Pulses, No clubbing, cyanosis, or edema  NERVOUS SYSTEM:  Alert & Oriented X3; Moving all 4 extremities; 5/5 strength throughout, dysmetric movement L hand  SKIN: No rashes or lesions;    LABS:                        13.4   8.40  )-----------( 272      ( 2017 07:41 )             39.0     07-    134<L>  |  96  |  18  ----------------------------<  190<H>  4.4   |  20<L>  |  0.65    Ca    9.2      2017 07:35  Phos  4.0     -  Mg     2.2         TPro  7.3  /  Alb  4.3  /  TBili  0.3  /  DBili  x   /  AST  21  /  ALT  32  /  AlkPhos  73  -    PT/INR - ( 2017 22:27 )   PT: 10.8 sec;   INR: 0.99 ratio         PTT - ( 2017 22:27 )  PTT:28.4 sec    CAPILLARY BLOOD GLUCOSE          RADIOLOGY & ADDITIONAL STUDIES:    EKG:   Personally Reviewed:  [X] YES - sinus rhythm w/APCs on outpt EKG     Imaging:   Personally Reviewed:  [X] YES < from: MRI Head w/wo Cont (17 @ 09:24) >  Right parietal lobe solid and cystic mass with a small amount of   surrounding edema. Differential diagnostic considerations include primary   versus secondary neoplasm.    < end of copied text >                Consultant(s) notes reviewed:    Care Discussed with Consultant(s)/Other Providers:

## 2017-07-25 NOTE — ED ADULT NURSE REASSESSMENT NOTE - NS ED NURSE REASSESS COMMENT FT1
2320: Report received from Sang Oliveros RN. Pt AAOx4, neurologically intact, Shereen, no weakness, no facial droop, PERRLA, NAD, resp nonlabored, skin warm/dry, resting comfortably in bed with spouse at bedside. Pt denies headache, dizziness, chest pain, palpitations, SOB, fevers, chills, weakness at this time. Pt awaiting bed. RN will click RTM once CTs are completed as per MD. Safety maintained.    0040: Report given to ED Holding TARAS Ko. No changes observed. Pt AAOx4, NAD, neurologically intact, Shereen, no weakness, facial droop noted, PERRLA, skin warm/dry, pt resting comfortably in bed with  at bedside. Pt passed Dysphagia screening, documented in [place] [employer], ED Holding TARAS Turpin made aware Dysphgia was completed. Pt transported to Holding Area with .

## 2017-07-25 NOTE — PROGRESS NOTE ADULT - ASSESSMENT
61 RHF with no history of cancer p/w "spatial disorientation," "difficulty walking" and forgetting basic tasks like typing, progressive over 6 weeks. She went to  where CTH shows approximately 3x4cm right parietal cystic lesion. She was given 10mg decadron and 500 mg Keppra and transferred to [facility] for further evaluation. On arrival, patient states that over the past several weeks she has experienced forgetting how to fold clothes and type on the keyboard at times, causing her some concern. She states that her short term memory has been progressively worse and she has developed bilateral tremor for the past year. She denies fevers, chills, weight loss/gain, history of cancer, weakness, numbness, tingling, seizures. (24 Jul 2017 21:53)    PROCEDURE:  Adm via ED on 7/24with Rt parietal cystic lesion  POD# N/A    PLAN:  Neuro: MRI Brain done-Rpt pending FU.  Cont Keppra. [id number] AM.  OR timing TBD.  Start SQL if no immediate OR plans.    Hosp/Med consult called this AM-FU.  4mm Lt Lung nodule on CT CAP-FU Medicine recomm.    Respiratory: Patient instructed to use incentive spirometer [ ] YES [X ] NO                 DVT ppx: [ ] SQL [ ] SQH and Venodynes [ ] Left [ ] Right [X ] Bilateral    Discharge planning:  PT Mary. 61 RHF with no history of cancer p/w "spatial disorientation," "difficulty walking" and forgetting basic tasks like typing, progressive over 6 weeks. She went to  where CTH shows approximately 3x4cm right parietal cystic lesion. She was given 10mg decadron and 500 mg Keppra and transferred to [facility] for further evaluation. On arrival, patient states that over the past several weeks she has experienced forgetting how to fold clothes and type on the keyboard at times, causing her some concern. She states that her short term memory has been progressively worse and she has developed bilateral tremor for the past year. She denies fevers, chills, weight loss/gain, history of cancer, weakness, numbness, tingling, seizures. (24 Jul 2017 21:53)    PROCEDURE:  Adm via ED on 7/24 with Rt parietal cystic lesion  POD# N/A    PLAN:  Neuro: MRI Brain done-Rpt pending FU.  Cont Keppra. [id number] AM.  OR ?Thursday.  Start SQL if no immediate OR plans.    Hosp/Med consult called this AM-FU.  4mm Lt Lung nodule on CT CAP-FU Medicine recomm.    Respiratory: Patient instructed to use incentive spirometer [ ] YES [X ] NO                 DVT ppx: [ ] SQL [ ] SQH and Venodynes [ ] Left [ ] Right [X ] Bilateral    Discharge planning:  PT Mary.

## 2017-07-25 NOTE — CONSULT NOTE ADULT - PROBLEM SELECTOR RECOMMENDATION 6
Pt has known about lung nodule since 2013 and it has not grown, therefore regardless of risk factor history (she is prior smoker) she does not need to follow this nodule any further as it has been stable x 4 yrs

## 2017-07-26 LAB
ANION GAP SERPL CALC-SCNC: 15 MMOL/L — SIGNIFICANT CHANGE UP (ref 5–17)
APTT BLD: 26.9 SEC — LOW (ref 27.5–37.4)
BUN SERPL-MCNC: 19 MG/DL — SIGNIFICANT CHANGE UP (ref 7–23)
CALCIUM SERPL-MCNC: 9.1 MG/DL — SIGNIFICANT CHANGE UP (ref 8.4–10.5)
CHLORIDE SERPL-SCNC: 101 MMOL/L — SIGNIFICANT CHANGE UP (ref 96–108)
CO2 SERPL-SCNC: 25 MMOL/L — SIGNIFICANT CHANGE UP (ref 22–31)
CREAT ?TM UR-MCNC: 181 MG/DL — SIGNIFICANT CHANGE UP
CREAT SERPL-MCNC: 0.64 MG/DL — SIGNIFICANT CHANGE UP (ref 0.5–1.3)
GLUCOSE SERPL-MCNC: 96 MG/DL — SIGNIFICANT CHANGE UP (ref 70–99)
HCT VFR BLD CALC: 37.5 % — SIGNIFICANT CHANGE UP (ref 34.5–45)
HGB BLD-MCNC: 12.5 G/DL — SIGNIFICANT CHANGE UP (ref 11.5–15.5)
INR BLD: 0.96 RATIO — SIGNIFICANT CHANGE UP (ref 0.88–1.16)
MAGNESIUM SERPL-MCNC: 2.4 MG/DL — SIGNIFICANT CHANGE UP (ref 1.6–2.6)
MCHC RBC-ENTMCNC: 32.6 PG — SIGNIFICANT CHANGE UP (ref 27–34)
MCHC RBC-ENTMCNC: 33.3 GM/DL — SIGNIFICANT CHANGE UP (ref 32–36)
MCV RBC AUTO: 97.7 FL — SIGNIFICANT CHANGE UP (ref 80–100)
OSMOLALITY SERPL: 291 MOS/KG — SIGNIFICANT CHANGE UP (ref 275–300)
OSMOLALITY UR: 910 MOS/KG — SIGNIFICANT CHANGE UP (ref 50–1200)
PLATELET # BLD AUTO: 239 K/UL — SIGNIFICANT CHANGE UP (ref 150–400)
POTASSIUM SERPL-MCNC: 3.7 MMOL/L — SIGNIFICANT CHANGE UP (ref 3.5–5.3)
POTASSIUM SERPL-SCNC: 3.7 MMOL/L — SIGNIFICANT CHANGE UP (ref 3.5–5.3)
PROTHROM AB SERPL-ACNC: 10.5 SEC — SIGNIFICANT CHANGE UP (ref 9.8–12.7)
RBC # BLD: 3.84 M/UL — SIGNIFICANT CHANGE UP (ref 3.8–5.2)
RBC # FLD: 13.3 % — SIGNIFICANT CHANGE UP (ref 10.3–14.5)
SODIUM SERPL-SCNC: 141 MMOL/L — SIGNIFICANT CHANGE UP (ref 135–145)
SODIUM UR-SCNC: 102 MMOL/L — SIGNIFICANT CHANGE UP
WBC # BLD: 8.42 K/UL — SIGNIFICANT CHANGE UP (ref 3.8–10.5)
WBC # FLD AUTO: 8.42 K/UL — SIGNIFICANT CHANGE UP (ref 3.8–10.5)

## 2017-07-26 PROCEDURE — 99232 SBSQ HOSP IP/OBS MODERATE 35: CPT

## 2017-07-26 RX ORDER — DEXTROSE MONOHYDRATE, SODIUM CHLORIDE, AND POTASSIUM CHLORIDE 50; .745; 4.5 G/1000ML; G/1000ML; G/1000ML
1000 INJECTION, SOLUTION INTRAVENOUS
Qty: 0 | Refills: 0 | Status: DISCONTINUED | OUTPATIENT
Start: 2017-07-26 | End: 2017-07-27

## 2017-07-26 RX ORDER — FLUOXETINE HCL 10 MG
40 CAPSULE ORAL DAILY
Qty: 0 | Refills: 0 | Status: DISCONTINUED | OUTPATIENT
Start: 2017-07-26 | End: 2017-07-27

## 2017-07-26 RX ORDER — POTASSIUM CHLORIDE 20 MEQ
40 PACKET (EA) ORAL ONCE
Qty: 0 | Refills: 0 | Status: COMPLETED | OUTPATIENT
Start: 2017-07-26 | End: 2017-07-26

## 2017-07-26 RX ADMIN — LEVETIRACETAM 500 MILLIGRAM(S): 250 TABLET, FILM COATED ORAL at 05:14

## 2017-07-26 RX ADMIN — Medication 100 MILLIGRAM(S): at 21:12

## 2017-07-26 RX ADMIN — LEVETIRACETAM 500 MILLIGRAM(S): 250 TABLET, FILM COATED ORAL at 17:17

## 2017-07-26 RX ADMIN — Medication 100 MILLIGRAM(S): at 13:36

## 2017-07-26 RX ADMIN — Medication 40 MILLIEQUIVALENT(S): at 11:37

## 2017-07-26 RX ADMIN — Medication 40 MILLIGRAM(S): at 13:36

## 2017-07-26 RX ADMIN — Medication 100 MILLIGRAM(S): at 05:14

## 2017-07-26 NOTE — PROGRESS NOTE ADULT - ASSESSMENT
61 RHF with no history of cancer p/w "spatial disorientation," "difficulty walking" and forgetting basic tasks like typing, progressive over 6 weeks. She went to  where CTH shows approximately 3x4cm right parietal cystic lesion. She was given 10mg decadron and 500 mg Keppra and transferred to [facility] for further evaluation. On arrival, patient states that over the past several weeks she has experienced forgetting how to fold clothes and type on the keyboard at times, causing her some concern. She states that her short term memory has been progressively worse and she has developed bilateral tremor for the past year. She denies fevers, chills, weight loss/gain, history of cancer, weakness, numbness, tingling, seizures. (24 Jul 2017 21:53)    PROCEDURE:  Adm via ED on 7/24 with Rt parietal cystic lesion  POD# N/A    PLAN:  Neuro: Preop for OR 7/27-IVF, NPO after MN, Hold AC. Cont Keppra. Resume home dose Prozac 40mg QD. Affyzisgsmba=610 to 141 this AM.  Hosp/Med-Brain mass. Recommendation: RCRI = 0, METS > 4 no further testing is indicated no objection to proceed with procedure. Pt is low risk for procedure. AEDs, steroids, resection per NSGY. f/u path.·  Problem: Hyponatremia.  Recommendation: Mgmt per NSGY - check urine lytes and osm, may need salt restriction vs hypertonic saline as per NSGY, monitor BMP. ·  Problem: Irregular heart beat.  Recommendation: Pt reports she has had palpitations, extra beats for years. P waves on EKG, not afib. No objection to proceed with procedure - monitor BMP, Mg, make sure K > 4, Mg > 2 correct other electrolyte abnormalities.     Respiratory: Patient instructed to use incentive spirometer [ ] YES [X ] NO                 DVT ppx: [ ] SQL [ ] SQH and Venodynes [ ] Left [ ] Right [X ] Bilateral    Discharge planning:  PT eval-P.

## 2017-07-26 NOTE — PROGRESS NOTE ADULT - SUBJECTIVE AND OBJECTIVE BOX
PRE-OP NOTE    Procedure:   Attending:     HPI:  61 RHF with no history of cancer p/w "spatial disorientation," "difficulty walking" and forgetting basic tasks like typing, progressive over 6 weeks. She went to  where CTH shows approximately 3x4cm right parietal cystic lesion. She was given 10mg decadron and 500 mg Keppra and transferred to [facility] for further evaluation. On arrival, patient states that over the past several weeks she has experienced forgetting how to fold clothes and type on the keyboard at times, causing her some concern. She states that her short term memory has been progressively worse and she has developed bilateral tremor for the past year. She denies fevers, chills, weight loss/gain, history of cancer, weakness, numbness, tingling, seizures. (24 Jul 2017 21:53)      T(C): 36.9 (07-26-17 @ 15:27), Max: 36.9 (07-26-17 @ 07:46)  HR: 78 (07-26-17 @ 15:27) (66 - 99)  BP: 134/73 (07-26-17 @ 15:27) (115/75 - 138/88)  RR: 18 (07-26-17 @ 15:27) (18 - 18)  SpO2: 95% (07-26-17 @ 15:27) (93% - 97%)  Wt(kg): --    07-26    141  |  101  |  19  ----------------------------<  96  3.7   |  25  |  0.64    Ca    9.1      26 Jul 2017 08:35  Phos  4.0     07-24  Mg     2.4     07-26    TPro  7.3  /  Alb  4.3  /  TBili  0.3  /  DBili  x   /  AST  21  /  ALT  32  /  AlkPhos  73  07-24    CBC Full  -  ( 26 Jul 2017 08:46 )  WBC Count : 8.42 K/uL  Hemoglobin : 12.5 g/dL  Hematocrit : 37.5 %  Platelet Count - Automated : 239 K/uL  Mean Cell Volume : 97.7 fl  Mean Cell Hemoglobin : 32.6 pg  Mean Cell Hemoglobin Concentration : 33.3 gm/dL  Auto Neutrophil # : x  Auto Lymphocyte # : x  Auto Monocyte # : x  Auto Eosinophil # : x  Auto Basophil # : x  Auto Neutrophil % : x  Auto Lymphocyte % : x  Auto Monocyte % : x  Auto Eosinophil % : x  Auto Basophil % : x    PT/INR - ( 24 Jul 2017 22:27 )   PT: 10.8 sec;   INR: 0.99 ratio         PTT - ( 24 Jul 2017 22:27 )  PTT:28.4 sec    Pregnancy test: Not Needed    Type & Screen (in past 72hrs): Done x 2    Medical clearance: Complete    Consent: Complete PRE-OP NOTE    Procedure: Right craniotomy for brain tumor  Attending: Vishal    HPI:  61 RHF with no history of cancer p/w "spatial disorientation," "difficulty walking" and forgetting basic tasks like typing, progressive over 6 weeks. She went to  where CTH shows approximately 3x4cm right parietal cystic lesion. She was given 10mg decadron and 500 mg Keppra and transferred to [facility] for further evaluation. On arrival, patient states that over the past several weeks she has experienced forgetting how to fold clothes and type on the keyboard at times, causing her some concern. She states that her short term memory has been progressively worse and she has developed bilateral tremor for the past year. She denies fevers, chills, weight loss/gain, history of cancer, weakness, numbness, tingling, seizures. (24 Jul 2017 21:53)    AOx3, FC, PERRL, EOMI, no facial   5/5 throughout, no drift  SILT  no clonus      T(C): 36.9 (07-26-17 @ 15:27), Max: 36.9 (07-26-17 @ 07:46)  HR: 78 (07-26-17 @ 15:27) (66 - 99)  BP: 134/73 (07-26-17 @ 15:27) (115/75 - 138/88)  RR: 18 (07-26-17 @ 15:27) (18 - 18)  SpO2: 95% (07-26-17 @ 15:27) (93% - 97%)  Wt(kg): --    07-26    141  |  101  |  19  ----------------------------<  96  3.7   |  25  |  0.64    Ca    9.1      26 Jul 2017 08:35  Phos  4.0     07-24  Mg     2.4     07-26    TPro  7.3  /  Alb  4.3  /  TBili  0.3  /  DBili  x   /  AST  21  /  ALT  32  /  AlkPhos  73  07-24    CBC Full  -  ( 26 Jul 2017 08:46 )  WBC Count : 8.42 K/uL  Hemoglobin : 12.5 g/dL  Hematocrit : 37.5 %  Platelet Count - Automated : 239 K/uL  Mean Cell Volume : 97.7 fl  Mean Cell Hemoglobin : 32.6 pg  Mean Cell Hemoglobin Concentration : 33.3 gm/dL  Auto Neutrophil # : x  Auto Lymphocyte # : x  Auto Monocyte # : x  Auto Eosinophil # : x  Auto Basophil # : x  Auto Neutrophil % : x  Auto Lymphocyte % : x  Auto Monocyte % : x  Auto Eosinophil % : x  Auto Basophil % : x    PT/INR - ( 24 Jul 2017 22:27 )   PT: 10.8 sec;   INR: 0.99 ratio         PTT - ( 24 Jul 2017 22:27 )  PTT:28.4 sec    Pregnancy test: Not Needed    Type & Screen (in past 72hrs): Done x 2    Medical clearance: Complete    Consent: Complete

## 2017-07-26 NOTE — PROGRESS NOTE ADULT - ASSESSMENT
Risks, benefits and alternatives of procedure have been discussed with patient and family. All questions have been answered. NPO after midnight. Hold all AC.

## 2017-07-26 NOTE — PROGRESS NOTE ADULT - SUBJECTIVE AND OBJECTIVE BOX
SUBJECTIVE: Comfortable,Appears improved    OVERNIGHT EVENTS: none    Vital Signs Last 24 Hrs  T(C): 36.7 (26 Jul 2017 04:50), Max: 36.8 (25 Jul 2017 16:37)  T(F): 98 (26 Jul 2017 04:50), Max: 98.3 (25 Jul 2017 16:37)  HR: 70 (26 Jul 2017 04:50) (67 - 76)  BP: 115/90 (26 Jul 2017 04:50) (115/90 - 143/74)  BP(mean): --  RR: 18 (26 Jul 2017 04:50) (16 - 18)  SpO2: 96% (26 Jul 2017 04:50) (94% - 96%)  IVF: [X ] IVL   DIET: [ X] Regular   +voiding  PCA: [ ] YES [ X] NO     DRAINS: n/a    PHYSICAL EXAM:    General: No Acute Distress     Neurological: AOx3, FC, PERRL, EOMI, V1-3 intact, no facial, tongue midline, GRANADOS 5/5 throughout, no drift. SILT. No clonus or babinski. Mild left dysmetria, bilateral UE with very mild coarse tremors.      Pulmonary: Clear to Auscultation, No Rales, No Rhonchi, No Wheezes     Cardiovascular: S1, S2, Regular Rate and Rhythm     Gastrointestinal: Soft, Nontender, Nondistended     Extremities: No calf tenderness     Incision: n/a    LABS:                          13.4   8.40  )-----------( 272      ( 25 Jul 2017 07:41 )             39.0    07-25    134<L>  |  96  |  18  ----------------------------<  190<H>  4.4   |  20<L>  |  0.65    Ca    9.2      25 Jul 2017 07:35  Phos  4.0     07-24  Mg     2.2     07-24    TPro  7.3  /  Alb  4.3  /  TBili  0.3  /  DBili  x   /  AST  21  /  ALT  32  /  AlkPhos  73  07-24  PT/INR - ( 24 Jul 2017 22:27 )   PT: 10.8 sec;   INR: 0.99 ratio         PTT - ( 24 Jul 2017 22:27 )  PTT:28.4 sec      IMAGING: CT Chest, Abdomen and Pelvis w/ Oral Cont and w/ IV Cont (07.24.17 @ 23:54) >  No primary or metastatic disease.    4 mm nonspecific left lung nodule, and the presence of risk factors 6-12   months follow-up low dose CT may be considered.    MRI Head w/wo Cont (07.25.17 @ 09:24) >  Right parietal lobe solid and cystic mass with a small amount of   surrounding edema. Differential diagnostic considerations include primary   versus secondary neoplasm.      MEDICATIONS  (STANDING):  FLUoxetine 10 milliGRAM(s) Oral daily  levETIRAcetam 500 milliGRAM(s) Oral every 12 hours  docusate sodium 100 milliGRAM(s) Oral three times a day  buDESOnide  80 MICROgram(s)/formoterol 4.5 MICROgram(s) Inhaler 2 Puff(s) Inhalation two times a day    MEDICATIONS  (PRN):  acetaminophen   Tablet 650 milliGRAM(s) Oral every 6 hours PRN For Temp greater than 38 C (100.4 F)  acetaminophen   Tablet. 650 milliGRAM(s) Oral every 6 hours PRN Mild Pain (1 - 3)  ondansetron Injectable 4 milliGRAM(s) IV Push every 6 hours PRN Nausea and/or Vomiting  senna 2 Tablet(s) Oral at bedtime PRN Constipation

## 2017-07-27 ENCOUNTER — RESULT REVIEW (OUTPATIENT)
Age: 62
End: 2017-07-27

## 2017-07-27 ENCOUNTER — TRANSCRIPTION ENCOUNTER (OUTPATIENT)
Age: 62
End: 2017-07-27

## 2017-07-27 ENCOUNTER — APPOINTMENT (OUTPATIENT)
Dept: NEUROSURGERY | Facility: CLINIC | Age: 62
End: 2017-07-27

## 2017-07-27 LAB
ANION GAP SERPL CALC-SCNC: 13 MMOL/L — SIGNIFICANT CHANGE UP (ref 5–17)
BUN SERPL-MCNC: 12 MG/DL — SIGNIFICANT CHANGE UP (ref 7–23)
CALCIUM SERPL-MCNC: 8.3 MG/DL — LOW (ref 8.4–10.5)
CHLORIDE SERPL-SCNC: 103 MMOL/L — SIGNIFICANT CHANGE UP (ref 96–108)
CO2 SERPL-SCNC: 25 MMOL/L — SIGNIFICANT CHANGE UP (ref 22–31)
CREAT SERPL-MCNC: 0.61 MG/DL — SIGNIFICANT CHANGE UP (ref 0.5–1.3)
GAS PNL BLDA: SIGNIFICANT CHANGE UP
GLUCOSE SERPL-MCNC: 150 MG/DL — HIGH (ref 70–99)
HCT VFR BLD CALC: 36.4 % — SIGNIFICANT CHANGE UP (ref 34.5–45)
HGB BLD-MCNC: 12.5 G/DL — SIGNIFICANT CHANGE UP (ref 11.5–15.5)
MAGNESIUM SERPL-MCNC: 2.2 MG/DL — SIGNIFICANT CHANGE UP (ref 1.6–2.6)
MCHC RBC-ENTMCNC: 34.5 GM/DL — SIGNIFICANT CHANGE UP (ref 32–36)
MCHC RBC-ENTMCNC: 35.5 PG — HIGH (ref 27–34)
MCV RBC AUTO: 103 FL — HIGH (ref 80–100)
PHOSPHATE SERPL-MCNC: 4.5 MG/DL — SIGNIFICANT CHANGE UP (ref 2.5–4.5)
PLATELET # BLD AUTO: 203 K/UL — SIGNIFICANT CHANGE UP (ref 150–400)
POTASSIUM SERPL-MCNC: 4.4 MMOL/L — SIGNIFICANT CHANGE UP (ref 3.5–5.3)
POTASSIUM SERPL-SCNC: 4.4 MMOL/L — SIGNIFICANT CHANGE UP (ref 3.5–5.3)
RBC # BLD: 3.53 M/UL — LOW (ref 3.8–5.2)
RBC # FLD: 11.9 % — SIGNIFICANT CHANGE UP (ref 10.3–14.5)
SODIUM SERPL-SCNC: 141 MMOL/L — SIGNIFICANT CHANGE UP (ref 135–145)
WBC # BLD: 12.1 K/UL — HIGH (ref 3.8–10.5)
WBC # FLD AUTO: 12.1 K/UL — HIGH (ref 3.8–10.5)

## 2017-07-27 PROCEDURE — 61781 SCAN PROC CRANIAL INTRA: CPT | Mod: 59

## 2017-07-27 PROCEDURE — 88331 PATH CONSLTJ SURG 1 BLK 1SPC: CPT | Mod: 26

## 2017-07-27 PROCEDURE — 88334 PATH CONSLTJ SURG CYTO XM EA: CPT | Mod: 26,59

## 2017-07-27 PROCEDURE — 61510 CRNEC TREPH EXC BRN TUM STTL: CPT

## 2017-07-27 PROCEDURE — 71010: CPT | Mod: 26

## 2017-07-27 PROCEDURE — 99291 CRITICAL CARE FIRST HOUR: CPT

## 2017-07-27 PROCEDURE — 88360 TUMOR IMMUNOHISTOCHEM/MANUAL: CPT | Mod: 26

## 2017-07-27 PROCEDURE — 88307 TISSUE EXAM BY PATHOLOGIST: CPT | Mod: 26

## 2017-07-27 PROCEDURE — 88342 IMHCHEM/IMCYTCHM 1ST ANTB: CPT | Mod: 26,59

## 2017-07-27 PROCEDURE — 88341 IMHCHEM/IMCYTCHM EA ADD ANTB: CPT | Mod: 26,59

## 2017-07-27 RX ORDER — OXYCODONE AND ACETAMINOPHEN 5; 325 MG/1; MG/1
1 TABLET ORAL EVERY 4 HOURS
Qty: 0 | Refills: 0 | Status: DISCONTINUED | OUTPATIENT
Start: 2017-07-27 | End: 2017-07-29

## 2017-07-27 RX ORDER — HYDROMORPHONE HYDROCHLORIDE 2 MG/ML
0.5 INJECTION INTRAMUSCULAR; INTRAVENOUS; SUBCUTANEOUS EVERY 4 HOURS
Qty: 0 | Refills: 0 | Status: DISCONTINUED | OUTPATIENT
Start: 2017-07-27 | End: 2017-07-28

## 2017-07-27 RX ORDER — ACETAMINOPHEN 500 MG
650 TABLET ORAL EVERY 6 HOURS
Qty: 0 | Refills: 0 | Status: DISCONTINUED | OUTPATIENT
Start: 2017-07-27 | End: 2017-07-27

## 2017-07-27 RX ORDER — DEXTROSE MONOHYDRATE, SODIUM CHLORIDE, AND POTASSIUM CHLORIDE 50; .745; 4.5 G/1000ML; G/1000ML; G/1000ML
1000 INJECTION, SOLUTION INTRAVENOUS
Qty: 0 | Refills: 0 | Status: DISCONTINUED | OUTPATIENT
Start: 2017-07-27 | End: 2017-07-27

## 2017-07-27 RX ORDER — DOCUSATE SODIUM 100 MG
100 CAPSULE ORAL THREE TIMES A DAY
Qty: 0 | Refills: 0 | Status: DISCONTINUED | OUTPATIENT
Start: 2017-07-27 | End: 2017-07-29

## 2017-07-27 RX ORDER — FLUOXETINE HCL 10 MG
40 CAPSULE ORAL DAILY
Qty: 0 | Refills: 0 | Status: DISCONTINUED | OUTPATIENT
Start: 2017-07-27 | End: 2017-07-29

## 2017-07-27 RX ORDER — OXYCODONE AND ACETAMINOPHEN 5; 325 MG/1; MG/1
2 TABLET ORAL EVERY 6 HOURS
Qty: 0 | Refills: 0 | Status: DISCONTINUED | OUTPATIENT
Start: 2017-07-27 | End: 2017-07-28

## 2017-07-27 RX ORDER — LEVETIRACETAM 250 MG/1
500 TABLET, FILM COATED ORAL EVERY 12 HOURS
Qty: 0 | Refills: 0 | Status: DISCONTINUED | OUTPATIENT
Start: 2017-07-27 | End: 2017-07-29

## 2017-07-27 RX ORDER — ACETAMINOPHEN 500 MG
1000 TABLET ORAL ONCE
Qty: 0 | Refills: 0 | Status: COMPLETED | OUTPATIENT
Start: 2017-07-27 | End: 2017-07-27

## 2017-07-27 RX ORDER — PANTOPRAZOLE SODIUM 20 MG/1
40 TABLET, DELAYED RELEASE ORAL DAILY
Qty: 0 | Refills: 0 | Status: DISCONTINUED | OUTPATIENT
Start: 2017-07-27 | End: 2017-07-27

## 2017-07-27 RX ORDER — CEFAZOLIN SODIUM 1 G
2000 VIAL (EA) INJECTION EVERY 8 HOURS
Qty: 0 | Refills: 0 | Status: COMPLETED | OUTPATIENT
Start: 2017-07-27 | End: 2017-07-28

## 2017-07-27 RX ORDER — LEVETIRACETAM 250 MG/1
500 TABLET, FILM COATED ORAL EVERY 12 HOURS
Qty: 0 | Refills: 0 | Status: DISCONTINUED | OUTPATIENT
Start: 2017-07-27 | End: 2017-07-27

## 2017-07-27 RX ORDER — DOCUSATE SODIUM 100 MG
100 CAPSULE ORAL THREE TIMES A DAY
Qty: 0 | Refills: 0 | Status: DISCONTINUED | OUTPATIENT
Start: 2017-07-27 | End: 2017-07-27

## 2017-07-27 RX ORDER — HYDROMORPHONE HYDROCHLORIDE 2 MG/ML
0.5 INJECTION INTRAMUSCULAR; INTRAVENOUS; SUBCUTANEOUS
Qty: 0 | Refills: 0 | Status: DISCONTINUED | OUTPATIENT
Start: 2017-07-27 | End: 2017-07-28

## 2017-07-27 RX ORDER — DEXTROSE MONOHYDRATE, SODIUM CHLORIDE, AND POTASSIUM CHLORIDE 50; .745; 4.5 G/1000ML; G/1000ML; G/1000ML
1000 INJECTION, SOLUTION INTRAVENOUS
Qty: 0 | Refills: 0 | Status: DISCONTINUED | OUTPATIENT
Start: 2017-07-27 | End: 2017-07-28

## 2017-07-27 RX ORDER — BUDESONIDE AND FORMOTEROL FUMARATE DIHYDRATE 160; 4.5 UG/1; UG/1
2 AEROSOL RESPIRATORY (INHALATION)
Qty: 0 | Refills: 0 | Status: DISCONTINUED | OUTPATIENT
Start: 2017-07-27 | End: 2017-07-29

## 2017-07-27 RX ORDER — ONDANSETRON 8 MG/1
4 TABLET, FILM COATED ORAL EVERY 6 HOURS
Qty: 0 | Refills: 0 | Status: DISCONTINUED | OUTPATIENT
Start: 2017-07-27 | End: 2017-07-29

## 2017-07-27 RX ORDER — ONDANSETRON 8 MG/1
4 TABLET, FILM COATED ORAL ONCE
Qty: 0 | Refills: 0 | Status: DISCONTINUED | OUTPATIENT
Start: 2017-07-27 | End: 2017-07-27

## 2017-07-27 RX ORDER — DEXAMETHASONE 0.5 MG/5ML
4 ELIXIR ORAL EVERY 6 HOURS
Qty: 0 | Refills: 0 | Status: DISCONTINUED | OUTPATIENT
Start: 2017-07-27 | End: 2017-07-29

## 2017-07-27 RX ADMIN — DEXTROSE MONOHYDRATE, SODIUM CHLORIDE, AND POTASSIUM CHLORIDE 75 MILLILITER(S): 50; .745; 4.5 INJECTION, SOLUTION INTRAVENOUS at 14:08

## 2017-07-27 RX ADMIN — HYDROMORPHONE HYDROCHLORIDE 0.5 MILLIGRAM(S): 2 INJECTION INTRAMUSCULAR; INTRAVENOUS; SUBCUTANEOUS at 19:15

## 2017-07-27 RX ADMIN — HYDROMORPHONE HYDROCHLORIDE 0.5 MILLIGRAM(S): 2 INJECTION INTRAMUSCULAR; INTRAVENOUS; SUBCUTANEOUS at 14:31

## 2017-07-27 RX ADMIN — HYDROMORPHONE HYDROCHLORIDE 0.5 MILLIGRAM(S): 2 INJECTION INTRAMUSCULAR; INTRAVENOUS; SUBCUTANEOUS at 14:45

## 2017-07-27 RX ADMIN — DEXTROSE MONOHYDRATE, SODIUM CHLORIDE, AND POTASSIUM CHLORIDE 75 MILLILITER(S): 50; .745; 4.5 INJECTION, SOLUTION INTRAVENOUS at 00:00

## 2017-07-27 RX ADMIN — HYDROMORPHONE HYDROCHLORIDE 0.5 MILLIGRAM(S): 2 INJECTION INTRAMUSCULAR; INTRAVENOUS; SUBCUTANEOUS at 23:15

## 2017-07-27 RX ADMIN — Medication 1000 MILLIGRAM(S): at 02:00

## 2017-07-27 RX ADMIN — Medication 4 MILLIGRAM(S): at 23:02

## 2017-07-27 RX ADMIN — LEVETIRACETAM 500 MILLIGRAM(S): 250 TABLET, FILM COATED ORAL at 17:27

## 2017-07-27 RX ADMIN — Medication 100 MILLIGRAM(S): at 18:24

## 2017-07-27 RX ADMIN — Medication 4 MILLIGRAM(S): at 17:27

## 2017-07-27 RX ADMIN — Medication 100 MILLIGRAM(S): at 05:09

## 2017-07-27 RX ADMIN — HYDROMORPHONE HYDROCHLORIDE 0.5 MILLIGRAM(S): 2 INJECTION INTRAMUSCULAR; INTRAVENOUS; SUBCUTANEOUS at 13:30

## 2017-07-27 RX ADMIN — Medication 400 MILLIGRAM(S): at 19:30

## 2017-07-27 RX ADMIN — DEXTROSE MONOHYDRATE, SODIUM CHLORIDE, AND POTASSIUM CHLORIDE 75 MILLILITER(S): 50; .745; 4.5 INJECTION, SOLUTION INTRAVENOUS at 19:56

## 2017-07-27 RX ADMIN — Medication 100 MILLIGRAM(S): at 22:56

## 2017-07-27 RX ADMIN — HYDROMORPHONE HYDROCHLORIDE 0.5 MILLIGRAM(S): 2 INJECTION INTRAMUSCULAR; INTRAVENOUS; SUBCUTANEOUS at 19:04

## 2017-07-27 RX ADMIN — LEVETIRACETAM 500 MILLIGRAM(S): 250 TABLET, FILM COATED ORAL at 05:09

## 2017-07-27 RX ADMIN — HYDROMORPHONE HYDROCHLORIDE 0.5 MILLIGRAM(S): 2 INJECTION INTRAMUSCULAR; INTRAVENOUS; SUBCUTANEOUS at 23:00

## 2017-07-27 RX ADMIN — HYDROMORPHONE HYDROCHLORIDE 0.5 MILLIGRAM(S): 2 INJECTION INTRAMUSCULAR; INTRAVENOUS; SUBCUTANEOUS at 13:45

## 2017-07-27 NOTE — BRIEF OPERATIVE NOTE - PROCEDURE
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

## 2017-07-27 NOTE — PROGRESS NOTE ADULT - SUBJECTIVE AND OBJECTIVE BOX
HPI:  61 RHF with no history of cancer p/w "spatial disorientation," "difficulty walking" and forgetting basic tasks like typing, progressive over 6 weeks. She went to  where CTH shows approximately 3x4cm right parietal cystic lesion. She was given 10mg decadron and 500 mg Keppra and transferred to [facility] for further evaluation. On arrival, patient states that over the past several weeks she has experienced forgetting how to fold clothes and type on the keyboard at times, causing her some concern. She states that her short term memory has been progressively worse and she has developed bilateral tremor for the past year. She denies fevers, chills, weight loss/gain, history of cancer, weakness, numbness, tingling, seizures. (24 Jul 2017 21:53)    s/p R crani for tumor resection, adm to [facility].    VITALS:  T(C): , Max: 37 (07-27-17 @ 05:10)  HR:  (58 - 78)  BP:  (127/71 - 147/78)  ABP:  (126/61 - 150/71)  RR:  (15 - 18)  SpO2:  (95% - 100%)  Wt(kg): --      07-26-17 @ 07:01  -  07-27-17 @ 07:00  --------------------------------------------------------  IN: 1470 mL / OUT: 0 mL / NET: 1470 mL    07-27-17 @ 07:01  -  07-27-17 @ 13:57  --------------------------------------------------------  IN: 0 mL / OUT: 300 mL / NET: -300 mL      LABS:  Na: 141 (07-26 @ 08:35), 134 (07-25 @ 07:35), 139 (07-24 @ 22:25), 137 (07-24 @ 18:59)  K: 3.7 (07-26 @ 08:35), 4.4 (07-25 @ 07:35), 3.9 (07-24 @ 22:25), 4.0 (07-24 @ 18:59)  Cl: 101 (07-26 @ 08:35), 96 (07-25 @ 07:35), 99 (07-24 @ 22:25), 103 (07-24 @ 18:59)  CO2: 25 (07-26 @ 08:35), 20 (07-25 @ 07:35), 22 (07-24 @ 22:25), 29 (07-24 @ 18:59)  BUN: 19 (07-26 @ 08:35), 18 (07-25 @ 07:35), 20 (07-24 @ 22:25), 23 (07-24 @ 18:59)  Cr: 0.64 (07-26 @ 08:35), 0.65 (07-25 @ 07:35), 0.60 (07-24 @ 22:25), 0.59 (07-24 @ 18:59)  Glu: 96(07-26 @ 08:35), 190(07-25 @ 07:35), 121(07-24 @ 22:25), 96(07-24 @ 18:59)    Hgb: 12.5 (07-26 @ 08:46), 13.4 (07-25 @ 07:41), 13.6 (07-24 @ 22:25), 13.6 (07-24 @ 18:59)  Hct: 37.5 (07-26 @ 08:46), 39.0 (07-25 @ 07:41), 39.5 (07-24 @ 22:25), 39.7 (07-24 @ 18:59)  WBC: 8.42 (07-26 @ 08:46), 8.40 (07-25 @ 07:41), 9.1 (07-24 @ 22:25), 7.4 (07-24 @ 18:59)  Plt: 239 (07-26 @ 08:46), 272 (07-25 @ 07:41), 238 (07-24 @ 22:25), 236 (07-24 @ 18:59)  PT: 10.5 (07-26 @ 18:46)  INR: 0.96 (07-26 @ 18:46)  aPTT: 26.9 (07-26 @ 18:46)    IMAGING:   Recent imaging studies were reviewed.    MEDICATIONS:  acetaminophen   Tablet 650 milliGRAM(s) Oral every 6 hours PRN  acetaminophen   Tablet. 650 milliGRAM(s) Oral every 6 hours PRN  ondansetron Injectable 4 milliGRAM(s) IV Push every 6 hours PRN  senna 2 Tablet(s) Oral at bedtime PRN  HYDROmorphone  Injectable 0.5 milliGRAM(s) IV Push every 10 minutes PRN  ondansetron Injectable 4 milliGRAM(s) IV Push once PRN  levETIRAcetam 500 milliGRAM(s) Oral every 12 hours  buDESOnide  80 MICROgram(s)/formoterol 4.5 MICROgram(s) Inhaler 2 Puff(s) Inhalation two times a day  FLUoxetine 40 milliGRAM(s) Oral daily  sodium chloride 0.9% with potassium chloride 20 mEq/L 1000 milliLiter(s) IV Continuous <Continuous>  sodium chloride 0.9% with potassium chloride 20 mEq/L 1000 milliLiter(s) IV Continuous <Continuous>  oxyCODONE    5 mG/acetaminophen 325 mG 1 Tablet(s) Oral every 4 hours PRN  oxyCODONE    5 mG/acetaminophen 325 mG 2 Tablet(s) Oral every 6 hours PRN  levETIRAcetam 500 milliGRAM(s) Oral every 12 hours  dexamethasone     Tablet 4 milliGRAM(s) Oral every 6 hours  ondansetron Injectable 4 milliGRAM(s) IV Push every 6 hours PRN  pantoprazole    Tablet 40 milliGRAM(s) Oral daily  docusate sodium 100 milliGRAM(s) Oral three times a day  multivitamin 1 Tablet(s) Oral daily  ceFAZolin   IVPB 2000 milliGRAM(s) IV Intermittent every 8 hours    EXAMINATION:  General:  calm, pleasant  HEENT:  MMM  Neuro:  awake, alert, fully oriented, follows commands, moves all extremities 5/5  Cards:  RRR  Respiratory:  no respiratory distress  Adomen:  soft  Extremities:  no edema  Skin:  warm/dry

## 2017-07-27 NOTE — PROGRESS NOTE ADULT - ASSESSMENT
Summary: POD 0 R crani tumor resection frozen GBM    NEURO:  q1h neuro checks, CTH in am, pain control, decadron taper, levetiracetam for seizure ppx,     CARDS:  -150    PULM:  sat > 92%, cont. home inhalers    RENAL:  IVL    GASTRO:  advance as tolerated  ---> Stress ulcer prophylaxis:  PPI not required    HEME:  monitor H/H    ---> DVT prophylaxis: SCDs, hold anticoagulation, fresh post op    ENDO:  euglycemia    ID:  afebrile    Code status:  Full code  Disposition:  ICU

## 2017-07-28 LAB
ANION GAP SERPL CALC-SCNC: 13 MMOL/L — SIGNIFICANT CHANGE UP (ref 5–17)
BUN SERPL-MCNC: 9 MG/DL — SIGNIFICANT CHANGE UP (ref 7–23)
CALCIUM SERPL-MCNC: 8.5 MG/DL — SIGNIFICANT CHANGE UP (ref 8.4–10.5)
CHLORIDE SERPL-SCNC: 101 MMOL/L — SIGNIFICANT CHANGE UP (ref 96–108)
CO2 SERPL-SCNC: 25 MMOL/L — SIGNIFICANT CHANGE UP (ref 22–31)
CREAT SERPL-MCNC: 0.51 MG/DL — SIGNIFICANT CHANGE UP (ref 0.5–1.3)
GLUCOSE SERPL-MCNC: 145 MG/DL — HIGH (ref 70–99)
OSMOLALITY SERPL: 289 MOS/KG — SIGNIFICANT CHANGE UP (ref 275–300)
OSMOLALITY UR: 131 MOS/KG — LOW (ref 300–900)
POTASSIUM SERPL-MCNC: 4.3 MMOL/L — SIGNIFICANT CHANGE UP (ref 3.5–5.3)
POTASSIUM SERPL-SCNC: 4.3 MMOL/L — SIGNIFICANT CHANGE UP (ref 3.5–5.3)
SODIUM SERPL-SCNC: 139 MMOL/L — SIGNIFICANT CHANGE UP (ref 135–145)
SP GR UR STRIP: 1 — LOW (ref 1.01–1.02)

## 2017-07-28 PROCEDURE — 99254 IP/OBS CNSLTJ NEW/EST MOD 60: CPT | Mod: GC

## 2017-07-28 PROCEDURE — 70450 CT HEAD/BRAIN W/O DYE: CPT | Mod: 26

## 2017-07-28 PROCEDURE — 99291 CRITICAL CARE FIRST HOUR: CPT

## 2017-07-28 RX ORDER — ENOXAPARIN SODIUM 100 MG/ML
40 INJECTION SUBCUTANEOUS AT BEDTIME
Qty: 0 | Refills: 0 | Status: DISCONTINUED | OUTPATIENT
Start: 2017-07-28 | End: 2017-07-29

## 2017-07-28 RX ORDER — OXYCODONE AND ACETAMINOPHEN 5; 325 MG/1; MG/1
2 TABLET ORAL EVERY 4 HOURS
Qty: 0 | Refills: 0 | Status: DISCONTINUED | OUTPATIENT
Start: 2017-07-28 | End: 2017-07-29

## 2017-07-28 RX ORDER — ALPRAZOLAM 0.25 MG
0.5 TABLET ORAL ONCE
Qty: 0 | Refills: 0 | Status: DISCONTINUED | OUTPATIENT
Start: 2017-07-28 | End: 2017-07-29

## 2017-07-28 RX ORDER — ACETAMINOPHEN 500 MG
1000 TABLET ORAL ONCE
Qty: 0 | Refills: 0 | Status: COMPLETED | OUTPATIENT
Start: 2017-07-28 | End: 2017-07-28

## 2017-07-28 RX ORDER — HYDROMORPHONE HYDROCHLORIDE 2 MG/ML
0.5 INJECTION INTRAMUSCULAR; INTRAVENOUS; SUBCUTANEOUS ONCE
Qty: 0 | Refills: 0 | Status: DISCONTINUED | OUTPATIENT
Start: 2017-07-28 | End: 2017-07-28

## 2017-07-28 RX ADMIN — HYDROMORPHONE HYDROCHLORIDE 0.5 MILLIGRAM(S): 2 INJECTION INTRAMUSCULAR; INTRAVENOUS; SUBCUTANEOUS at 11:30

## 2017-07-28 RX ADMIN — HYDROMORPHONE HYDROCHLORIDE 0.5 MILLIGRAM(S): 2 INJECTION INTRAMUSCULAR; INTRAVENOUS; SUBCUTANEOUS at 20:39

## 2017-07-28 RX ADMIN — Medication 4 MILLIGRAM(S): at 14:21

## 2017-07-28 RX ADMIN — Medication 650 MILLIGRAM(S): at 23:04

## 2017-07-28 RX ADMIN — LEVETIRACETAM 500 MILLIGRAM(S): 250 TABLET, FILM COATED ORAL at 05:33

## 2017-07-28 RX ADMIN — HYDROMORPHONE HYDROCHLORIDE 0.5 MILLIGRAM(S): 2 INJECTION INTRAMUSCULAR; INTRAVENOUS; SUBCUTANEOUS at 06:46

## 2017-07-28 RX ADMIN — Medication 100 MILLIGRAM(S): at 05:33

## 2017-07-28 RX ADMIN — Medication 100 MILLIGRAM(S): at 14:20

## 2017-07-28 RX ADMIN — Medication 650 MILLIGRAM(S): at 23:34

## 2017-07-28 RX ADMIN — Medication 40 MILLIGRAM(S): at 14:20

## 2017-07-28 RX ADMIN — HYDROMORPHONE HYDROCHLORIDE 0.5 MILLIGRAM(S): 2 INJECTION INTRAMUSCULAR; INTRAVENOUS; SUBCUTANEOUS at 03:30

## 2017-07-28 RX ADMIN — Medication 100 MILLIGRAM(S): at 00:51

## 2017-07-28 RX ADMIN — HYDROMORPHONE HYDROCHLORIDE 0.5 MILLIGRAM(S): 2 INJECTION INTRAMUSCULAR; INTRAVENOUS; SUBCUTANEOUS at 21:00

## 2017-07-28 RX ADMIN — Medication 1000 MILLIGRAM(S): at 01:00

## 2017-07-28 RX ADMIN — Medication 400 MILLIGRAM(S): at 00:50

## 2017-07-28 RX ADMIN — Medication 4 MILLIGRAM(S): at 23:04

## 2017-07-28 RX ADMIN — HYDROMORPHONE HYDROCHLORIDE 0.5 MILLIGRAM(S): 2 INJECTION INTRAMUSCULAR; INTRAVENOUS; SUBCUTANEOUS at 03:45

## 2017-07-28 RX ADMIN — BUDESONIDE AND FORMOTEROL FUMARATE DIHYDRATE 2 PUFF(S): 160; 4.5 AEROSOL RESPIRATORY (INHALATION) at 18:47

## 2017-07-28 RX ADMIN — Medication 4 MILLIGRAM(S): at 05:34

## 2017-07-28 RX ADMIN — Medication 4 MILLIGRAM(S): at 18:15

## 2017-07-28 RX ADMIN — LEVETIRACETAM 500 MILLIGRAM(S): 250 TABLET, FILM COATED ORAL at 18:15

## 2017-07-28 RX ADMIN — HYDROMORPHONE HYDROCHLORIDE 0.5 MILLIGRAM(S): 2 INJECTION INTRAMUSCULAR; INTRAVENOUS; SUBCUTANEOUS at 07:00

## 2017-07-28 RX ADMIN — OXYCODONE AND ACETAMINOPHEN 2 TABLET(S): 5; 325 TABLET ORAL at 14:55

## 2017-07-28 RX ADMIN — HYDROMORPHONE HYDROCHLORIDE 0.5 MILLIGRAM(S): 2 INJECTION INTRAMUSCULAR; INTRAVENOUS; SUBCUTANEOUS at 10:00

## 2017-07-28 NOTE — CONSULT NOTE ADULT - ATTENDING COMMENTS
Patient interviewed/examined.  Agree with history, ROS, PE, assessment and plan as outlined above.    Will need post-op RT with possible temozolomide adjuvantly.

## 2017-07-28 NOTE — PHYSICAL THERAPY INITIAL EVALUATION ADULT - PERTINENT HX OF CURRENT PROBLEM, REHAB EVAL
61 RHF with no history of cancer p/w "spatial disorientation," "difficulty walking" and forgetting basic tasks like typing, progressive over 6 weeks. She went to  where CTH shows approximately 3x4cm right parietal cystic lesion. She was given 10mg decadron and 500 mg Keppra and transferred to [facility] for further evaluation. (CONT.)

## 2017-07-28 NOTE — CONSULT NOTE ADULT - ASSESSMENT
Ms. Urbano is a 60 y/o F patient, with no PMH presented with spatial disorientation, difficulty walking and forgetting basic tasks- with CT head and MRI head done showing right parietal lobe mass with edema, s/p craniotomy and resection with frozen section showing high grade glioma/ GBM.

## 2017-07-28 NOTE — CONSULT NOTE ADULT - SUBJECTIVE AND OBJECTIVE BOX
HPI:  Ms. Urbano is a 60 y/o F patient, with no PMH presented with spatial disorientation, difficulty walking and forgetting basic tasks like typing, progressive over 6 weeks. She went to  where CTH shows approximately 3x4cm right parietal cystic lesion. She was given 10mg decadron and 500 mg Keppra and transferred to [facility] for further evaluation. On arrival, patient states that over the past several weeks she has experienced forgetting how to fold clothes and type on the keyboard at times, causing her some concern. She states that her short term memory has been progressively worse and she has developed bilateral tremor for the past year. She denies fevers, chills, weight loss/gain, history of cancer, weakness, numbness, tingling, seizures.   She has MRI done showing right parietal lobe mass s/p craniotomy and resection on , with frozen section positive for high grade glioma/ likely GBM.   Currently feels okay.    PAST MEDICAL & SURGICAL HISTORY:  Asthma  S/P     FAMILY HISTORY:    Social History  MEDICATIONS  (STANDING):  levETIRAcetam 500 milliGRAM(s) Oral every 12 hours  buDESOnide  80 MICROgram(s)/formoterol 4.5 MICROgram(s) Inhaler 2 Puff(s) Inhalation two times a day  FLUoxetine 40 milliGRAM(s) Oral daily  dexamethasone     Tablet 4 milliGRAM(s) Oral every 6 hours  docusate sodium 100 milliGRAM(s) Oral three times a day  multivitamin 1 Tablet(s) Oral daily  enoxaparin Injectable 40 milliGRAM(s) SubCutaneous at bedtime    MEDICATIONS  (PRN):  acetaminophen   Tablet 650 milliGRAM(s) Oral every 6 hours PRN For Temp greater than 38 C (100.4 F)  acetaminophen   Tablet. 650 milliGRAM(s) Oral every 6 hours PRN Mild Pain (1 - 3)  senna 2 Tablet(s) Oral at bedtime PRN Constipation  oxyCODONE    5 mG/acetaminophen 325 mG 1 Tablet(s) Oral every 4 hours PRN Moderate Pain  ondansetron Injectable 4 milliGRAM(s) IV Push every 6 hours PRN Nausea and/or Vomiting  oxyCODONE    5 mG/acetaminophen 325 mG 2 Tablet(s) Oral every 4 hours PRN Severe Pain (7 - 10)    No Known Drug Allergies  shellfish (Rash)    REVIEW OF SYSTEMS    10 point ROS is negative except for the ones in HPI.  	  Vital Signs Last 24 Hrs  T(C): 36.6 (2017 07:00), Max: 36.6 (2017 01:00)  T(F): 97.9 (2017 07:00), Max: 97.9 (2017 01:00)  HR: 83 (2017 14:00) (56 - 102)  BP: 136/76 (2017 14:00) (109/60 - 165/78)  BP(mean): 99 (2017 12:00) (80 - 111)  RR: 16 (2017 14:00) (14 - 19)  SpO2: 94% (2017 14:00) (90% - 100%)      Physical Examination  Constitutional: Alert, oriented X3  Eyes: Normal  ENMT: normal  Neck: No lymphadneopathy  Respiratory: b/l clear to auscultation  Cardiovascular: S1,S2,M0  Abdomen: Soft, non tender, BS present  Gastrointestinal: soft, non tender, BS present  Extremities: soft, no edema  Neurological: intact  Skin: no petechiae  Musculoskeletal: normal  Psychiatric: normal                            12.5   12.1  )-----------( 203      ( 2017 21:24 )             36.4     07-28    139  |  101  |  9   ----------------------------<  145<H>  4.3   |  25  |  0.51    Ca    8.5      2017 07:24  Phos  4.5       Mg     2.2             Radiology  Assessment and Plan HPI:  Ms. Urbano is a 62 y/o F patient, with no PMH presented with spatial disorientation, difficulty walking and forgetting basic tasks like typing, progressive over 6 weeks. She went to  where CTH shows approximately 3x4cm right parietal cystic lesion. She was given 10mg decadron and 500 mg Keppra and transferred to [facility] for further evaluation. On arrival, patient states that over the past several weeks she has experienced forgetting how to fold clothes and type on the keyboard at times, causing her some concern. She states that her short term memory has been progressively worse and she has developed bilateral tremor for the past year. She denies fevers, chills, weight loss/gain, history of cancer, weakness, numbness, tingling, seizures.   She has MRI done showing right parietal lobe mass s/p craniotomy and resection on , with frozen section positive for high grade glioma/ likely GBM.   Currently feels okay. She feels her memory is back to baseline. She is able to walk without difficulty. No nausea, vomiting, headache, blurring of vision. Appetite is normal.    PAST MEDICAL & SURGICAL HISTORY:  Asthma  S/P     FAMILY HISTORY:    Social History  MEDICATIONS  (STANDING):  levETIRAcetam 500 milliGRAM(s) Oral every 12 hours  buDESOnide  80 MICROgram(s)/formoterol 4.5 MICROgram(s) Inhaler 2 Puff(s) Inhalation two times a day  FLUoxetine 40 milliGRAM(s) Oral daily  dexamethasone     Tablet 4 milliGRAM(s) Oral every 6 hours  docusate sodium 100 milliGRAM(s) Oral three times a day  multivitamin 1 Tablet(s) Oral daily  enoxaparin Injectable 40 milliGRAM(s) SubCutaneous at bedtime    MEDICATIONS  (PRN):  acetaminophen   Tablet 650 milliGRAM(s) Oral every 6 hours PRN For Temp greater than 38 C (100.4 F)  acetaminophen   Tablet. 650 milliGRAM(s) Oral every 6 hours PRN Mild Pain (1 - 3)  senna 2 Tablet(s) Oral at bedtime PRN Constipation  oxyCODONE    5 mG/acetaminophen 325 mG 1 Tablet(s) Oral every 4 hours PRN Moderate Pain  ondansetron Injectable 4 milliGRAM(s) IV Push every 6 hours PRN Nausea and/or Vomiting  oxyCODONE    5 mG/acetaminophen 325 mG 2 Tablet(s) Oral every 4 hours PRN Severe Pain (7 - 10)    No Known Drug Allergies  shellfish (Rash)    REVIEW OF SYSTEMS    10 point ROS is negative except for the ones in HPI.  	  Vital Signs Last 24 Hrs  T(C): 36.6 (2017 07:00), Max: 36.6 (2017 01:00)  T(F): 97.9 (2017 07:00), Max: 97.9 (2017 01:00)  HR: 83 (2017 14:00) (56 - 102)  BP: 136/76 (2017 14:00) (109/60 - 165/78)  BP(mean): 99 (2017 12:00) (80 - 111)  RR: 16 (2017 14:00) (14 - 19)  SpO2: 94% (2017 14:00) (90% - 100%)      Physical Examination  Constitutional: Alert, oriented X3  Eyes: Normal  ENMT: normal  Neck: No lymphadneopathy  Respiratory: b/l clear to auscultation  Cardiovascular: S1,S2,M0  Abdomen: Soft, non tender, BS present  Gastrointestinal: soft, non tender, BS present  Extremities: soft, no edema  Neurological: intact  Skin: no petechiae  Musculoskeletal: normal  Psychiatric: normal                            12.5   12.1  )-----------( 203      ( 2017 21:24 )             36.4     -28    139  |  101  |  9   ----------------------------<  145<H>  4.3   |  25  |  0.51    Ca    8.5      2017 07:24  Phos  4.5     07-27  Mg     2.2     27        Radiology  Assessment and Plan HPI:  Ms. Urbano is a 62 y/o F patient, with no PMH presented with spatial disorientation, difficulty walking and forgetting basic tasks like typing, progressive over 6 weeks. She went to  where CTH shows approximately 3x4cm right parietal cystic lesion. She was given 10mg decadron and 500 mg Keppra and transferred to [facility] for further evaluation. On arrival, patient states that over the past several weeks she has experienced forgetting how to fold clothes and type on the keyboard at times, causing her some concern. She states that her short term memory has been progressively worse and she has developed bilateral tremor for the past year. She denies fevers, chills, weight loss/gain, history of cancer, weakness, numbness, tingling, seizures.   She has MRI done showing right parietal lobe mass s/p craniotomy and resection on , with frozen section positive for high grade glioma/ likely GBM.   Currently feels okay. She feels her memory is back to baseline. She is able to walk without difficulty. No nausea, vomiting, headache, blurring of vision. Appetite is normal.    PAST MEDICAL & SURGICAL HISTORY:  Asthma  S/P     FAMILY HISTORY:    Social History  MEDICATIONS  (STANDING):  levETIRAcetam 500 milliGRAM(s) Oral every 12 hours  buDESOnide  80 MICROgram(s)/formoterol 4.5 MICROgram(s) Inhaler 2 Puff(s) Inhalation two times a day  FLUoxetine 40 milliGRAM(s) Oral daily  dexamethasone     Tablet 4 milliGRAM(s) Oral every 6 hours  docusate sodium 100 milliGRAM(s) Oral three times a day  multivitamin 1 Tablet(s) Oral daily  enoxaparin Injectable 40 milliGRAM(s) SubCutaneous at bedtime    MEDICATIONS  (PRN):  acetaminophen   Tablet 650 milliGRAM(s) Oral every 6 hours PRN For Temp greater than 38 C (100.4 F)  acetaminophen   Tablet. 650 milliGRAM(s) Oral every 6 hours PRN Mild Pain (1 - 3)  senna 2 Tablet(s) Oral at bedtime PRN Constipation  oxyCODONE    5 mG/acetaminophen 325 mG 1 Tablet(s) Oral every 4 hours PRN Moderate Pain  ondansetron Injectable 4 milliGRAM(s) IV Push every 6 hours PRN Nausea and/or Vomiting  oxyCODONE    5 mG/acetaminophen 325 mG 2 Tablet(s) Oral every 4 hours PRN Severe Pain (7 - 10)    No Known Drug Allergies  shellfish (Rash)    REVIEW OF SYSTEMS    10 point ROS is negative except for the ones in HPI.  	  Vital Signs Last 24 Hrs  T(C): 36.6 (2017 07:00), Max: 36.6 (2017 01:00)  T(F): 97.9 (2017 07:00), Max: 97.9 (2017 01:00)  HR: 83 (2017 14:00) (56 - 102)  BP: 136/76 (2017 14:00) (109/60 - 165/78)  BP(mean): 99 (2017 12:00) (80 - 111)  RR: 16 (2017 14:00) (14 - 19)  SpO2: 94% (2017 14:00) (90% - 100%)      Physical Examination  Constitutional: Alert, oriented X3  Eyes: Normal  ENMT: normal  Neck: No lymphadneopathy  Respiratory: b/l clear to auscultation  Cardiovascular: S1,S2,M0  Abdomen: Soft, non tender, BS present  Gastrointestinal: soft, non tender, BS present  Extremities: soft, no edema  Neurological: intact, right cranial incision intact  Skin: no petechiae  Musculoskeletal: normal  Psychiatric: normal                            12.5   12.1  )-----------( 203      ( 2017 21:24 )             36.4     07-28    139  |  101  |  9   ----------------------------<  145<H>  4.3   |  25  |  0.51    Ca    8.5      2017 07:24  Phos  4.5     07-27  Mg     2.2     07-27        Radiology  < from: MRI Head w/wo Cont (17 @ 09:24) >  CLINICAL INDICATION: Right parietal mass on previous brain CT    TECHNIQUE:  Multiplanar, multisequence MR images of the brain were   obtained with images acquired prior to and following the intravenous   administration of 9 cc of gadovist.    COMPARISON: Brain CT dated 2017    FINDINGS:  There is a right parietal lobe partially solid and partially   cystic mass measuring 5.8 cm AP x 3.6 cm transverse x 4.8 cm cc. The   cystic component is the dominant component of the mass and is located   anteriorly measuring 3.8 cm AP x 3.5 cm transverse x 3.9 cm CC. A   posterior to the cystic component is a heterogeneously enhancing   partially cystic mass measuring 1.7 cm AP x 2.7 cm transverse x 2.8 cm   cc. There is a small amount of surrounding edema. Edema is present within   the postcentral gyrus but spares the precentral gyrus. Subtle sulcal   effacement throughout the parietal lobe is present. Differential   diagnostic considerations include a primary versus secondary neoplasm.    There is no restricted diffusion. No acute infarct or hemorrhage is   present. No additional areas of abnormal enhancement are noted.    Flow voids at the skull base are within normal limits.    The orbits are not remarkable in appearance.    The paranasal sinuses and tympanomastoid cavities are free of acute   disease. A sphenoid sinus polyp versus retention cyst is incidentally   noted.    Impression:    Right parietal lobe solid and cystic mass with a small amount of   surrounding edema. Differential diagnostic considerations include primary   versus secondary neoplasm.    < end of copied text > HPI:  Ms. Urbano is a 60 y/o F patient, with no PMH presented with spatial disorientation, difficulty walking and forgetting basic tasks like typing, progressive over 6 weeks. She went to  where CTH shows approximately 3x4cm right parietal cystic lesion. She was given 10mg decadron and 500 mg Keppra and transferred to [facility] for further evaluation. On arrival, patient states that over the past several weeks she has experienced forgetting how to fold clothes and type on the keyboard at times, causing her some concern. She states that her short term memory has been progressively worse and she has developed bilateral tremor for the past year. She denies fevers, chills, weight loss/gain, history of cancer, weakness, numbness, tingling, seizures.   She has MRI done showing right parietal lobe mass s/p craniotomy and resection on , with frozen section positive for high grade glioma/ likely GBM.   Currently feels okay. She feels her memory is back to baseline. She is able to walk without difficulty. No nausea, vomiting, headache, blurring of vision. Appetite is normal.    PAST MEDICAL & SURGICAL HISTORY:  Asthma  S/P     FAMILY HISTORY:  Breast cancer in mother     Social History  MEDICATIONS  (STANDING):  levETIRAcetam 500 milliGRAM(s) Oral every 12 hours  buDESOnide  80 MICROgram(s)/formoterol 4.5 MICROgram(s) Inhaler 2 Puff(s) Inhalation two times a day  FLUoxetine 40 milliGRAM(s) Oral daily  dexamethasone     Tablet 4 milliGRAM(s) Oral every 6 hours  docusate sodium 100 milliGRAM(s) Oral three times a day  multivitamin 1 Tablet(s) Oral daily  enoxaparin Injectable 40 milliGRAM(s) SubCutaneous at bedtime    MEDICATIONS  (PRN):  acetaminophen   Tablet 650 milliGRAM(s) Oral every 6 hours PRN For Temp greater than 38 C (100.4 F)  acetaminophen   Tablet. 650 milliGRAM(s) Oral every 6 hours PRN Mild Pain (1 - 3)  senna 2 Tablet(s) Oral at bedtime PRN Constipation  oxyCODONE    5 mG/acetaminophen 325 mG 1 Tablet(s) Oral every 4 hours PRN Moderate Pain  ondansetron Injectable 4 milliGRAM(s) IV Push every 6 hours PRN Nausea and/or Vomiting  oxyCODONE    5 mG/acetaminophen 325 mG 2 Tablet(s) Oral every 4 hours PRN Severe Pain (7 - 10)    No Known Drug Allergies  shellfish (Rash)    REVIEW OF SYSTEMS    10 point ROS is negative except for the ones in HPI.  	  Vital Signs Last 24 Hrs  T(C): 36.6 (2017 07:00), Max: 36.6 (2017 01:00)  T(F): 97.9 (2017 07:00), Max: 97.9 (2017 01:00)  HR: 83 (2017 14:00) (56 - 102)  BP: 136/76 (2017 14:00) (109/60 - 165/78)  BP(mean): 99 (2017 12:00) (80 - 111)  RR: 16 (2017 14:00) (14 - 19)  SpO2: 94% (2017 14:00) (90% - 100%)      Physical Examination  Constitutional: Alert, oriented X3  Eyes: Normal  ENMT: normal  Neck: No lymphadneopathy  Respiratory: b/l clear to auscultation  Cardiovascular: S1,S2,M0  Abdomen: Soft, non tender, BS present  Gastrointestinal: soft, non tender, BS present  Extremities: soft, no edema  Neurological: intact, right cranial incision intact  Skin: no petechiae  Musculoskeletal: normal  Psychiatric: normal                            12.5   12.1  )-----------( 203      ( 2017 21:24 )             36.4     07-28    139  |  101  |  9   ----------------------------<  145<H>  4.3   |  25  |  0.51    Ca    8.5      2017 07:24  Phos  4.5     07-27  Mg     2.2     07-27        Radiology  < from: MRI Head w/wo Cont (17 @ 09:24) >  CLINICAL INDICATION: Right parietal mass on previous brain CT    TECHNIQUE:  Multiplanar, multisequence MR images of the brain were   obtained with images acquired prior to and following the intravenous   administration of 9 cc of gadovist.    COMPARISON: Brain CT dated 2017    FINDINGS:  There is a right parietal lobe partially solid and partially   cystic mass measuring 5.8 cm AP x 3.6 cm transverse x 4.8 cm cc. The   cystic component is the dominant component of the mass and is located   anteriorly measuring 3.8 cm AP x 3.5 cm transverse x 3.9 cm CC. A   posterior to the cystic component is a heterogeneously enhancing   partially cystic mass measuring 1.7 cm AP x 2.7 cm transverse x 2.8 cm   cc. There is a small amount of surrounding edema. Edema is present within   the postcentral gyrus but spares the precentral gyrus. Subtle sulcal   effacement throughout the parietal lobe is present. Differential   diagnostic considerations include a primary versus secondary neoplasm.    There is no restricted diffusion. No acute infarct or hemorrhage is   present. No additional areas of abnormal enhancement are noted.    Flow voids at the skull base are within normal limits.    The orbits are not remarkable in appearance.    The paranasal sinuses and tympanomastoid cavities are free of acute   disease. A sphenoid sinus polyp versus retention cyst is incidentally   noted.    Impression:    Right parietal lobe solid and cystic mass with a small amount of   surrounding edema. Differential diagnostic considerations include primary   versus secondary neoplasm.    < end of copied text >

## 2017-07-28 NOTE — CONSULT NOTE ADULT - PROBLEM SELECTOR RECOMMENDATION 9
s/p craniotomy and complete resection. 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 Await final path.  D/W patient and her , diagnosis from frozen section and the need to wait for the final path. s/p craniotomy and complete resection. 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 Await final path.  D/W patient and her , diagnosis from frozen section and the need to wait for the final path.  Also, discussed that therapy will entail chemotherapy/radiation therapy. But will talk more about treatment options once final path is back.    Will F/U on path is back.    Please page at [phone] with questions or concerns. s/p craniotomy and complete resection. 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 Await final path.  D/W patient and her , diagnosis from frozen section and the need to wait for the final path.  Also, discussed that therapy will entail chemotherapy/radiation therapy. But will talk more about treatment options once final path is back.    Will F/U on path is back.  Will F/U at [facility] on discharge.    Please page at [phone] with questions or concerns.

## 2017-07-28 NOTE — PHYSICAL THERAPY INITIAL EVALUATION ADULT - ADDITIONAL COMMENTS
(CONT FROM ABOVE): On arrival, pt states that over the past several weeks she has experienced forgetting how to fold clothes and type on the keyboard at times, causing her some concern. She states that her short term memory has been progressively worse and she has developed bilateral tremor for the past year. She denies fevers, chills, weight loss/gain, history of cancer, weakness, numbness, tingling, seizures. Other (Free Text): Pamphlet given on PRP.\\n[place] [facility] [place]\\nDiscussed realistic expectations, due to total loss will be doubtful results. Detail Level: Zone Note Text (......Xxx Chief Complaint.): This diagnosis correlates with the (CONT FROM ABOVE): On arrival, pt states that over the past several weeks she has experienced forgetting how to fold clothes and type on the keyboard at times, causing her some concern. She states that her short term memory has been progressively worse and she has developed bilateral tremor for the past year. She denies fevers, chills, weight loss/gain, history of cancer, weakness, numbness, tingling, seizures.    Pt lives in a private house with 3 steps to enter, bilateral handrails; 10 steps to the bedroom, 1 handrail.

## 2017-07-28 NOTE — PHYSICAL THERAPY INITIAL EVALUATION ADULT - GENERAL OBSERVATIONS, REHAB EVAL
Received in the PACU reclined on chair, alert & verbally responsive, +dressing on R parietal aspect of head, reported 4/10 incisional pain but was recently medicated and agreeable to PT; spouse present

## 2017-07-28 NOTE — PROGRESS NOTE ADULT - SUBJECTIVE AND OBJECTIVE BOX
HPI:  61 RHF with no history of cancer p/w "spatial disorientation," "difficulty walking" and forgetting basic tasks like typing, progressive over 6 weeks. She went to  where CTH shows approximately 3x4cm right parietal cystic lesion. She was given 10mg decadron and 500 mg Keppra and transferred to [facility] for further evaluation. On arrival, patient states that over the past several weeks she has experienced forgetting how to fold clothes and type on the keyboard at times, causing her some concern. She states that her short term memory has been progressively worse and she has developed bilateral tremor for the past year. She denies fevers, chills, weight loss/gain, history of cancer, weakness, numbness, tingling, seizures. (24 Jul 2017 21:53) [facility] s/p R crani tumor resection.     No overnight events    ROS: incisional pain    VITALS:   T(C): 36.6 (07-28-17 @ 07:00), Max: 36.6 (07-28-17 @ 01:00)  HR: 65 (07-28-17 @ 08:00) (56 - 74)  BP: 165/78 (07-28-17 @ 08:00) (109/60 - 165/78)  RR: 16 (07-28-17 @ 08:00) (14 - 19)  SpO2: 98% (07-28-17 @ 08:00) (95% - 100%)    07-27-17 @ 07:01  -  07-28-17 @ 07:00  --------------------------------------------------------  IN: 1470 mL / OUT: 2920 mL / NET: -1450 mL    07-28-17 @ 07:01  -  07-28-17 @ 09:10  --------------------------------------------------------  IN: 150 mL / OUT: 410 mL / NET: -260 mL    LABS:  ABG - ( 27 Jul 2017 13:22 )  pH: 7.38  /  pCO2: 36    /  pO2: 115   / HCO3: 21    / Base Excess: -3.0  /  SaO2: 98                              12.5   12.1  )-----------( 203      ( 27 Jul 2017 21:24 )             36.4       139  |  101  |  9   ----------------------------<  145<H>  4.3   |  25  |  0.51    Ca    8.5      28 Jul 2017 07:24  Phos  4.5     07-27  Mg     2.2     07-27    PT/INR - ( 26 Jul 2017 18:46 )   PT: 10.5 sec;   INR: 0.96 ratio      PTT - ( 26 Jul 2017 18:46 )  PTT:26.9 sec    MEDICATIONS  (STANDING):  levETIRAcetam 500 milliGRAM(s) Oral every 12 hours  buDESOnide  80 MICROgram(s)/formoterol 4.5 MICROgram(s) Inhaler 2 Puff(s) Inhalation two times a day  FLUoxetine 40 milliGRAM(s) Oral daily  sodium chloride 0.9% with potassium chloride 20 mEq/L 1000 milliLiter(s) (75 mL/Hr) IV Continuous <Continuous>  dexamethasone     Tablet 4 milliGRAM(s) Oral every 6 hours  docusate sodium 100 milliGRAM(s) Oral three times a day  multivitamin 1 Tablet(s) Oral daily    [All pertinent recent Imaging/Reports reviewed]    DEVICES: [] Restraints [] RAYMOND/HMV []LD [] ET tube [] Trach [] A-line [] Reyes [] NGT [] Rectal Tube       EXAMINATION:  General:  calm, pleasant  HEENT:  MMM  Neuro:  awake, alert, fully oriented, follows commands, moves all extremities 5/5  Cards:  RRR  Respiratory:  no respiratory distress  Adomen:  soft  Extremities:  no edema  Skin:  warm/dry HPI:  61 RHF with no history of cancer p/w "spatial disorientation," "difficulty walking" and forgetting basic tasks like typing, progressive over 6 weeks. She went to  where CTH shows approximately 3x4cm right parietal cystic lesion. She was given 10mg decadron and 500 mg Keppra and transferred to [facility] for further evaluation. On arrival, patient states that over the past several weeks she has experienced forgetting how to fold clothes and type on the keyboard at times, causing her some concern. She states that her short term memory has been progressively worse and she has developed bilateral tremor for the past year. She denies fevers, chills, weight loss/gain, history of cancer, weakness, numbness, tingling, seizures. (24 Jul 2017 21:53) [facility].     No overnight events    ROS: incisional pain    VITALS:   T(C): 36.6 (07-28-17 @ 07:00), Max: 36.6 (07-28-17 @ 01:00)  HR: 65 (07-28-17 @ 08:00) (56 - 74)  BP: 165/78 (07-28-17 @ 08:00) (109/60 - 165/78)  RR: 16 (07-28-17 @ 08:00) (14 - 19)  SpO2: 98% (07-28-17 @ 08:00) (95% - 100%)    07-27-17 @ 07:01  -  07-28-17 @ 07:00  --------------------------------------------------------  IN: 1470 mL / OUT: 2920 mL / NET: -1450 mL    07-28-17 @ 07:01  -  07-28-17 @ 09:10  --------------------------------------------------------  IN: 150 mL / OUT: 410 mL / NET: -260 mL    LABS:  ABG - ( 27 Jul 2017 13:22 )  pH: 7.38  /  pCO2: 36    /  pO2: 115   / HCO3: 21    / Base Excess: -3.0  /  SaO2: 98                           12.5   12.1  )-----------( 203      ( 27 Jul 2017 21:24 )             36.4       139  |  101  |  9   ----------------------------<  145<H>  4.3   |  25  |  0.51    Ca    8.5      28 Jul 2017 07:24  Phos  4.5     07-27  Mg     2.2     07-27    PT/INR - ( 26 Jul 2017 18:46 )   PT: 10.5 sec;   INR: 0.96 ratio      PTT - ( 26 Jul 2017 18:46 )  PTT:26.9 sec    MEDICATIONS  (STANDING):  levETIRAcetam 500 milliGRAM(s) Oral every 12 hours  buDESOnide  80 MICROgram(s)/formoterol 4.5 MICROgram(s) Inhaler 2 Puff(s) Inhalation two times a day  FLUoxetine 40 milliGRAM(s) Oral daily  sodium chloride 0.9% with potassium chloride 20 mEq/L 1000 milliLiter(s) (75 mL/Hr) IV Continuous <Continuous>  dexamethasone     Tablet 4 milliGRAM(s) Oral every 6 hours  docusate sodium 100 milliGRAM(s) Oral three times a day  multivitamin 1 Tablet(s) Oral daily    [All pertinent recent Imaging/Reports reviewed]    EXAMINATION:  General:  calm, pleasant  HEENT:  MMM  Neuro:  awake, alert, fully oriented, follows commands, moves all extremities 5/5  Cards:  RRR  Respiratory:  no respiratory distress  Adomen:  soft  Extremities:  no edema  Skin:  warm/dry

## 2017-07-28 NOTE — PROGRESS NOTE ADULT - ASSESSMENT
Summary: POD 0 R crani tumor resection frozen GBM    NEURO:  q1h neuro checks, CTH in am, pain control, decadron taper, levetiracetam for seizure ppx,     CARDS:  -150    PULM:  sat > 92%, cont. home inhalers    RENAL:  IVL    GASTRO:  advance as tolerated  ---> Stress ulcer prophylaxis:  PPI not required    HEME:  monitor H/H    ---> DVT prophylaxis: SCDs, hold anticoagulation, fresh post op    ENDO:  euglycemia    ID:  afebrile    Code status:  Full code  Disposition:  ICU Summary: POD 1 R crani tumor resection frozen GBM    NEURO:  q4h neuro checks, CTH in am--post op changes, pain control, decadron taper, levetiracetam for seizure ppx,     CARDS:  -150    PULM:  sat > 92%, cont. home inhalers    RENAL:  IVF x24hrs post op per neurosx    GASTRO:  advance as tolerated  ---> Stress ulcer prophylaxis:  PPI not required    HEME:  monitor H/H    ---> DVT prophylaxis: SCDs, start lovenox tonight     ENDO:  euglycemia    ID:  afebrile    Code status:  Full code  Disposition:  ICU Summary: POD 1 R crani tumor resection frozen GBM    NEURO:  q4h neuro checks, CTH in am--post op changes, pain control, decadron taper, levetiracetam for seizure ppx,     CARDS:  -150    PULM:  sat > 92%, cont. home inhalers    RENAL:  IVF x24hrs post op per neurosx    GASTRO:  advance as tolerated  ---> Stress ulcer prophylaxis:  PPI not required    HEME:  monitor H/H    ---> DVT prophylaxis: SCDs, start lovenox tonight     ENDO:  euglycemia    ID:  afebrile    Code status:  Full code  Disposition:  ICU    critical care time 30 minutes dedicated to this patient at risk of neurological deterioration or death due to seizure intracerebral hemorrhage venous thromboembolism

## 2017-07-28 NOTE — PHYSICAL THERAPY INITIAL EVALUATION ADULT - PRECAUTIONS/LIMITATIONS, REHAB EVAL
CT CHEST, ABDOMEN, PELVIS 7/24: No primary or metastatic disease. 4 mm nonspecific left lung nodule, and the presence of risk factors 6-12 months follow-up low dose CT may be considered. MRI HEAD 7/25: Right parietal lobe solid and cystic mass with a small amount of surrounding edema. Differential diagnostic considerations include primary versus secondary neoplasm. CHEST X-RAY 7/27: Clear lungs. CT HEAD 7/28: Status post resection of a right parietal lobe mass, with postoperative changes edema and minimal parenchymal hemorrhage with local mass effect.

## 2017-07-29 ENCOUNTER — TRANSCRIPTION ENCOUNTER (OUTPATIENT)
Age: 62
End: 2017-07-29

## 2017-07-29 VITALS
RESPIRATION RATE: 18 BRPM | TEMPERATURE: 98 F | OXYGEN SATURATION: 100 % | SYSTOLIC BLOOD PRESSURE: 144 MMHG | HEART RATE: 97 BPM | DIASTOLIC BLOOD PRESSURE: 86 MMHG

## 2017-07-29 LAB
ANION GAP SERPL CALC-SCNC: 13 MMOL/L — SIGNIFICANT CHANGE UP (ref 5–17)
BUN SERPL-MCNC: 14 MG/DL — SIGNIFICANT CHANGE UP (ref 7–23)
CALCIUM SERPL-MCNC: 8.8 MG/DL — SIGNIFICANT CHANGE UP (ref 8.4–10.5)
CHLORIDE SERPL-SCNC: 99 MMOL/L — SIGNIFICANT CHANGE UP (ref 96–108)
CO2 SERPL-SCNC: 25 MMOL/L — SIGNIFICANT CHANGE UP (ref 22–31)
CREAT SERPL-MCNC: 0.49 MG/DL — LOW (ref 0.5–1.3)
GLUCOSE SERPL-MCNC: 157 MG/DL — HIGH (ref 70–99)
HCT VFR BLD CALC: 35.5 % — SIGNIFICANT CHANGE UP (ref 34.5–45)
HGB BLD-MCNC: 12.1 G/DL — SIGNIFICANT CHANGE UP (ref 11.5–15.5)
MCHC RBC-ENTMCNC: 34.2 GM/DL — SIGNIFICANT CHANGE UP (ref 32–36)
MCHC RBC-ENTMCNC: 35.2 PG — HIGH (ref 27–34)
MCV RBC AUTO: 103 FL — HIGH (ref 80–100)
PLATELET # BLD AUTO: 199 K/UL — SIGNIFICANT CHANGE UP (ref 150–400)
POTASSIUM SERPL-MCNC: 4.5 MMOL/L — SIGNIFICANT CHANGE UP (ref 3.5–5.3)
POTASSIUM SERPL-SCNC: 4.5 MMOL/L — SIGNIFICANT CHANGE UP (ref 3.5–5.3)
RBC # BLD: 3.45 M/UL — LOW (ref 3.8–5.2)
RBC # FLD: 11.7 % — SIGNIFICANT CHANGE UP (ref 10.3–14.5)
SODIUM SERPL-SCNC: 137 MMOL/L — SIGNIFICANT CHANGE UP (ref 135–145)
WBC # BLD: 13.2 K/UL — HIGH (ref 3.8–10.5)
WBC # FLD AUTO: 13.2 K/UL — HIGH (ref 3.8–10.5)

## 2017-07-29 PROCEDURE — 81314 PDGFRA GENE: CPT

## 2017-07-29 PROCEDURE — 81275 KRAS GENE VARIANTS EXON 2: CPT

## 2017-07-29 PROCEDURE — 81311 NRAS GENE VARIANTS EXON 2&3: CPT

## 2017-07-29 PROCEDURE — 70553 MRI BRAIN STEM W/O & W/DYE: CPT

## 2017-07-29 PROCEDURE — 88307 TISSUE EXAM BY PATHOLOGIST: CPT

## 2017-07-29 PROCEDURE — 82947 ASSAY GLUCOSE BLOOD QUANT: CPT

## 2017-07-29 PROCEDURE — 84132 ASSAY OF SERUM POTASSIUM: CPT

## 2017-07-29 PROCEDURE — 84300 ASSAY OF URINE SODIUM: CPT

## 2017-07-29 PROCEDURE — 81210 BRAF GENE: CPT

## 2017-07-29 PROCEDURE — 82570 ASSAY OF URINE CREATININE: CPT

## 2017-07-29 PROCEDURE — 82330 ASSAY OF CALCIUM: CPT

## 2017-07-29 PROCEDURE — 82803 BLOOD GASES ANY COMBINATION: CPT

## 2017-07-29 PROCEDURE — 93005 ELECTROCARDIOGRAM TRACING: CPT

## 2017-07-29 PROCEDURE — 80053 COMPREHEN METABOLIC PANEL: CPT

## 2017-07-29 PROCEDURE — 85027 COMPLETE CBC AUTOMATED: CPT

## 2017-07-29 PROCEDURE — 88331 PATH CONSLTJ SURG 1 BLK 1SPC: CPT

## 2017-07-29 PROCEDURE — 88334 PATH CONSLTJ SURG CYTO XM EA: CPT

## 2017-07-29 PROCEDURE — A9585: CPT

## 2017-07-29 PROCEDURE — 86900 BLOOD TYPING SEROLOGIC ABO: CPT

## 2017-07-29 PROCEDURE — 70450 CT HEAD/BRAIN W/O DYE: CPT

## 2017-07-29 PROCEDURE — 71045 X-RAY EXAM CHEST 1 VIEW: CPT

## 2017-07-29 PROCEDURE — 81235 EGFR GENE COM VARIANTS: CPT

## 2017-07-29 PROCEDURE — 81005 URINALYSIS: CPT

## 2017-07-29 PROCEDURE — 85610 PROTHROMBIN TIME: CPT

## 2017-07-29 PROCEDURE — 80048 BASIC METABOLIC PNL TOTAL CA: CPT

## 2017-07-29 PROCEDURE — 81404 MOPATH PROCEDURE LEVEL 5: CPT

## 2017-07-29 PROCEDURE — 70553 MRI BRAIN STEM W/O & W/DYE: CPT | Mod: 26

## 2017-07-29 PROCEDURE — C1713: CPT

## 2017-07-29 PROCEDURE — 94640 AIRWAY INHALATION TREATMENT: CPT

## 2017-07-29 PROCEDURE — C1889: CPT

## 2017-07-29 PROCEDURE — 88360 TUMOR IMMUNOHISTOCHEM/MANUAL: CPT

## 2017-07-29 PROCEDURE — C1769: CPT

## 2017-07-29 PROCEDURE — 81405 MOPATH PROCEDURE LEVEL 6: CPT

## 2017-07-29 PROCEDURE — 84295 ASSAY OF SERUM SODIUM: CPT

## 2017-07-29 PROCEDURE — 71260 CT THORAX DX C+: CPT

## 2017-07-29 PROCEDURE — 82435 ASSAY OF BLOOD CHLORIDE: CPT

## 2017-07-29 PROCEDURE — 86901 BLOOD TYPING SEROLOGIC RH(D): CPT

## 2017-07-29 PROCEDURE — 88342 IMHCHEM/IMCYTCHM 1ST ANTB: CPT

## 2017-07-29 PROCEDURE — 99285 EMERGENCY DEPT VISIT HI MDM: CPT | Mod: 25

## 2017-07-29 PROCEDURE — 85014 HEMATOCRIT: CPT

## 2017-07-29 PROCEDURE — 81403 MOPATH PROCEDURE LEVEL 4: CPT

## 2017-07-29 PROCEDURE — 83605 ASSAY OF LACTIC ACID: CPT

## 2017-07-29 PROCEDURE — 74177 CT ABD & PELVIS W/CONTRAST: CPT

## 2017-07-29 PROCEDURE — 81272 KIT GENE TARGETED SEQ ANALYS: CPT

## 2017-07-29 PROCEDURE — 83935 ASSAY OF URINE OSMOLALITY: CPT

## 2017-07-29 PROCEDURE — 84100 ASSAY OF PHOSPHORUS: CPT

## 2017-07-29 PROCEDURE — 83930 ASSAY OF BLOOD OSMOLALITY: CPT

## 2017-07-29 PROCEDURE — 88341 IMHCHEM/IMCYTCHM EA ADD ANTB: CPT

## 2017-07-29 PROCEDURE — 81276 KRAS GENE ADDL VARIANTS: CPT

## 2017-07-29 PROCEDURE — 85730 THROMBOPLASTIN TIME PARTIAL: CPT

## 2017-07-29 PROCEDURE — 86850 RBC ANTIBODY SCREEN: CPT

## 2017-07-29 PROCEDURE — 97161 PT EVAL LOW COMPLEX 20 MIN: CPT

## 2017-07-29 PROCEDURE — 83735 ASSAY OF MAGNESIUM: CPT

## 2017-07-29 RX ORDER — ACETAMINOPHEN 500 MG
2 TABLET ORAL
Qty: 0 | Refills: 0 | COMMUNITY
Start: 2017-07-29

## 2017-07-29 RX ORDER — LEVETIRACETAM 250 MG/1
1 TABLET, FILM COATED ORAL
Qty: 20 | Refills: 0 | OUTPATIENT
Start: 2017-07-29 | End: 2017-08-08

## 2017-07-29 RX ORDER — SENNA PLUS 8.6 MG/1
2 TABLET ORAL
Qty: 0 | Refills: 0 | COMMUNITY
Start: 2017-07-29

## 2017-07-29 RX ORDER — DEXAMETHASONE 0.5 MG/5ML
3 ELIXIR ORAL EVERY 8 HOURS
Qty: 0 | Refills: 0 | Status: DISCONTINUED | OUTPATIENT
Start: 2017-07-29 | End: 2017-07-29

## 2017-07-29 RX ORDER — OXYCODONE HYDROCHLORIDE 5 MG/1
1 TABLET ORAL
Qty: 42 | Refills: 0 | OUTPATIENT
Start: 2017-07-29 | End: 2017-08-05

## 2017-07-29 RX ORDER — DOCUSATE SODIUM 100 MG
1 CAPSULE ORAL
Qty: 0 | Refills: 0 | COMMUNITY
Start: 2017-07-29

## 2017-07-29 RX ORDER — INSULIN LISPRO 100/ML
VIAL (ML) SUBCUTANEOUS
Qty: 0 | Refills: 0 | Status: DISCONTINUED | OUTPATIENT
Start: 2017-07-29 | End: 2017-07-29

## 2017-07-29 RX ORDER — DEXAMETHASONE 0.5 MG/5ML
2 ELIXIR ORAL
Qty: 40 | Refills: 0 | OUTPATIENT
Start: 2017-07-29

## 2017-07-29 RX ORDER — OXYCODONE HYDROCHLORIDE 5 MG/1
5 TABLET ORAL EVERY 4 HOURS
Qty: 0 | Refills: 0 | Status: DISCONTINUED | OUTPATIENT
Start: 2017-07-29 | End: 2017-07-29

## 2017-07-29 RX ADMIN — ONDANSETRON 4 MILLIGRAM(S): 8 TABLET, FILM COATED ORAL at 04:04

## 2017-07-29 RX ADMIN — OXYCODONE AND ACETAMINOPHEN 2 TABLET(S): 5; 325 TABLET ORAL at 04:04

## 2017-07-29 RX ADMIN — Medication 3 MILLIGRAM(S): at 16:16

## 2017-07-29 RX ADMIN — Medication 1 TABLET(S): at 16:17

## 2017-07-29 RX ADMIN — LEVETIRACETAM 500 MILLIGRAM(S): 250 TABLET, FILM COATED ORAL at 05:26

## 2017-07-29 RX ADMIN — OXYCODONE AND ACETAMINOPHEN 2 TABLET(S): 5; 325 TABLET ORAL at 04:34

## 2017-07-29 RX ADMIN — Medication: at 16:17

## 2017-07-29 RX ADMIN — Medication 4 MILLIGRAM(S): at 05:26

## 2017-07-29 RX ADMIN — Medication 0.5 MILLIGRAM(S): at 00:00

## 2017-07-29 RX ADMIN — OXYCODONE AND ACETAMINOPHEN 2 TABLET(S): 5; 325 TABLET ORAL at 13:33

## 2017-07-29 RX ADMIN — Medication 100 MILLIGRAM(S): at 16:16

## 2017-07-29 RX ADMIN — ONDANSETRON 4 MILLIGRAM(S): 8 TABLET, FILM COATED ORAL at 13:32

## 2017-07-29 RX ADMIN — OXYCODONE AND ACETAMINOPHEN 2 TABLET(S): 5; 325 TABLET ORAL at 14:00

## 2017-07-29 RX ADMIN — Medication 40 MILLIGRAM(S): at 16:17

## 2017-07-29 NOTE — DISCHARGE NOTE ADULT - MEDICATION SUMMARY - MEDICATIONS TO TAKE
I will START or STAY ON the medications listed below when I get home from the hospital:    Rolling Walker  -- S/P Rt Pariet Crani rsx BT  -- Indication: For aid in ambulating    dexamethasone 2 mg oral tablet  -- 2 tab(s) by mouth every 6 hours x 8 doses, then 2tab every 8hrs x 6doses, then 2 tabs Q12hrs   -- It is very important that you take or use this exactly as directed.  Do not skip doses or discontinue unless directed by your doctor.  Obtain medical advice before taking any non-prescription drugs as some may affect the action of this medication.  Take with food or milk.    -- Indication: For Brain swelling    acetaminophen 325 mg oral tablet  -- 2 tab(s) by mouth every 6 hours, As needed, For Temp greater than 38 C (100.4 F)  -- Indication: For fever    acetaminophen 325 mg oral tablet  -- 2 tab(s) by mouth every 6 hours, As needed, Mild Pain (1 - 3)  -- Indication: For mild pain    oxyCODONE 5 mg oral capsule  -- 1 to 2 cap(s) by mouth every 4 hours as needed for moderate to severe pain MDD:5  -- Caution federal law prohibits the transfer of this drug to any person other  than the person for whom it was prescribed.  It is very important that you take or use this exactly as directed.  Do not skip doses or discontinue unless directed by your doctor.  May cause drowsiness.  Alcohol may intensify this effect.  Use care when operating dangerous machinery.  This prescription cannot be refilled.  Using more of this medication than prescribed may cause serious breathing problems.    -- Indication: For moderate to severe pain    levETIRAcetam 500 mg oral tablet  -- 1 tab(s) by mouth every 12 hours  -- Indication: For seizure prophylaxis    PROzac  --  by mouth   -- Indication: For Depression, unspecified depression type    Advair Diskus  --  inhaled   -- Indication: For asthma    docusate sodium 100 mg oral capsule  -- 1 cap(s) by mouth 3 times a day  -- Indication: For stool softener    senna oral tablet  -- 2 tab(s) by mouth once a day (at bedtime), As needed, Constipation  -- Indication: For constipation    Multiple Vitamins oral tablet  -- 1 tab(s) by mouth once a day  -- Indication: For supplement

## 2017-07-29 NOTE — DISCHARGE NOTE ADULT - PLAN OF CARE
strengthening, followup pathology Call Neurosurgery Dr MIREYA Rodgers for appointment in 1 week for wound check and staple removal.  Followup with your Private MD in 1-2 weeks.  Followup Hemo/Oncology at [facility]-call for appointment [phone]  Dr Rodgers office to setup appointment For RT with dr marrero  Return to Emergency Department or contact your Neurosurgeon if any changes in mental status, weakness, numbness or tingling of extremities; difficulty swallowing; drainage or redness of wound, fever; pain in legs; difficulty urinating or constipation.  Donot restart your Aspirin or take any Motrin/NSAIDS until checking with your Neurosurgeon. No strenous activity. No heavy lifting. Do not return to work until cleared by physician. No driving until cleared by physician.  You may shower on the 3rd day after you surgery.  No soaking in tub, Donot apply any ointements to incision.

## 2017-07-29 NOTE — PROGRESS NOTE ADULT - ATTENDING COMMENTS
critical care time 30 minutes dedicated to this patient at risk of neurological deterioration or death due to ICH at surgical site
Agree with resident note. Patient personally seen and examined. All current imaging studies reviewed.  for R craniotomy in am. D/W pt and  in great detail.  DC
Agree with resident note. Patient personally seen and examined. All current imaging studies reviewed.  for R craniotomy in am. D/W pt and  in great detail.  DC

## 2017-07-29 NOTE — DISCHARGE NOTE ADULT - NS AS ACTIVITY OBS
Walking-Indoors allowed/Bathing allowed/Do not make important decisions/Showering allowed/Do not drive or operate machinery/Stairs allowed/Walking-Outdoors allowed/No Heavy lifting/straining

## 2017-07-29 NOTE — DISCHARGE NOTE ADULT - PATIENT PORTAL LINK FT
“You can access the Follow[facility] Patient Portal, offered by [facility], by registering with the following website: [email or web address]”

## 2017-07-29 NOTE — DISCHARGE NOTE ADULT - OTHER SIGNIFICANT FINDINGS
The patient was admitted on 7/24/17 as a transfer from [facility] and taken to the OR 7/27/2017.  Postop course was uneventful.  The patient was on Ancef, SQL, and Decadron for routine postop management.  She is on Decadron 3mg Q8h and this will be taper over 1 week.    She was followed by Hemo/Onco for s/p craniotomy and complete resection. 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 Await final path. D/W patient and her , diagnosis from frozen section and the need to wait for the final path. Also, discussed that therapy will entail chemotherapy/radiation therapy. But will talk more about treatment options once final path is back. Will F/U at [facility] on discharge.     The patient was evaluated by PT and recommended no needs and a R/W was given.   She was subsequently DC on 7/29/2017 in stable condition.

## 2017-07-29 NOTE — PROGRESS NOTE ADULT - ASSESSMENT
61 RHF with no history of cancer p/w "spatial disorientation," "difficulty walking" and forgetting basic tasks like typing, progressive over 6 weeks. She went to  where CTH shows approximately 3x4cm right parietal cystic lesion. She was given 10mg decadron and 500 mg Keppra and transferred to [facility] for further evaluation. On arrival, patient states that over the past several weeks she has experienced forgetting how to fold clothes and type on the keyboard at times, causing her some concern. She states that her short term memory has been progressively worse and she has developed bilateral tremor for the past year. She denies fevers, chills, weight loss/gain, history of cancer, weakness, numbness, tingling, seizures. (24 Jul 2017 21:53)    PROCEDURE:  Adm via ED on 7/24 with Rt parietal cystic lesion. &7/27 Rt parietl Crani BT Rsxd-frozen GBM  POD# 2    PLAN:  Neuro: Decrease Decadron to 3mg Q8h, Cont Keppra.  MRI brain done this AM-Rpt-P. FU final Path.  Call RT/Dr Melissa [phone] on Monday for consult then DC Home.    Hemo/Onco- s/p craniotomy and complete resection. 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 Await final path. D/W patient and her , diagnosis from frozen section and the need to wait for the final path. Also, discussed that therapy will entail chemotherapy/radiation therapy. But will talk more about treatment options once final path is back. Will F/U on path is back. Will F/U at [facility] on discharge. Please page at [phone] with questions or concern    Hosp/Med-saw preop    Respiratory: Patient instructed to use incentive spirometer [X ] YES [ ] NO                 DVT ppx: [X ] SQL [ ] SQH and Venodynes [ ] Left [ ] Right [ ] Bilateral    Discharge planning:  PT-no skill needs, need RW. 61 RHF with no history of cancer p/w "spatial disorientation," "difficulty walking" and forgetting basic tasks like typing, progressive over 6 weeks. She went to  where CTH shows approximately 3x4cm right parietal cystic lesion. She was given 10mg decadron and 500 mg Keppra and transferred to [facility] for further evaluation. On arrival, patient states that over the past several weeks she has experienced forgetting how to fold clothes and type on the keyboard at times, causing her some concern. She states that her short term memory has been progressively worse and she has developed bilateral tremor for the past year. She denies fevers, chills, weight loss/gain, history of cancer, weakness, numbness, tingling, seizures. (24 Jul 2017 21:53)    PROCEDURE:  Adm via ED on 7/24 with Rt parietal cystic lesion. &7/27 Rt parietl Crani BT Rsxd-frozen GBM  POD# 2    PLAN:  Neuro: Decrease Decadron to 3mg Q8h, Cont Keppra.  MRI brain done this AM-Rpt-P. FU final Path.  DC Home today,  Dr MIREYA Rodgers to setup RT appt on FU visit (RT/Dr Melissa [phone]).    Hemo/Onco- s/p craniotomy and complete resection. 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 Await final path. D/W patient and her , diagnosis from frozen section and the need to wait for the final path. Also, discussed that therapy will entail chemotherapy/radiation therapy. But will talk more about treatment options once final path is back. Will F/U on path is back. Will F/U at [facility] on discharge. Please page at [phone] with questions or concern    Hosp/Med-saw preop    Respiratory: Patient instructed to use incentive spirometer [X ] YES [ ] NO                 DVT ppx: [X ] SQL [ ] SQH and Venodynes [ ] Left [ ] Right [ ] Bilateral    Discharge planning:  PT-no skill needs, need RW.

## 2017-07-29 NOTE — DISCHARGE NOTE ADULT - HOSPITAL COURSE
The patient was admitted on 7/24/17 as a transfer from [facility] and taken to the OR 7/27/2017.  Postop course was uneventful.  The patient was on Ancef, SQL, and Decadron for routine postop management.  She is on Decadron 3mg Q8h and this will be taper over 1 week.    She was followed by Hemo/Onco for s/p craniotomy and complete resection. 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 Await final path. D/W patient and her , diagnosis from frozen section and the need to wait for the final path. Also, discussed that therapy will entail chemotherapy/radiation therapy. But will talk more about treatment options once final path is back. Will F/U at [facility] on discharge.     The patient was evaluated by PT and recommended no needs and a R/W was given.   She was subsequently DC on 7/29/2017 in stable condition.

## 2017-07-29 NOTE — DISCHARGE NOTE ADULT - CARE PLAN
Principal Discharge DX:	Brain mass  Goal:	strengthening, followup pathology  Instructions for follow-up, activity and diet:	Call Neurosurgery Dr MIREYA Rodgers for appointment in 1 week for wound check and staple removal.  Followup with your Private MD in 1-2 weeks.  Followup Hemo/Oncology at [facility]-call for appointment [phone]  Dr Rodgers office to setup appointment For RT with dr marrero  Return to Emergency Department or contact your Neurosurgeon if any changes in mental status, weakness, numbness or tingling of extremities; difficulty swallowing; drainage or redness of wound, fever; pain in legs; difficulty urinating or constipation.  Donot restart your Aspirin or take any Motrin/NSAIDS until checking with your Neurosurgeon.  Instructions for follow-up, activity and diet:	No strenous activity. No heavy lifting. Do not return to work until cleared by physician. No driving until cleared by physician.  You may shower on the 3rd day after you surgery.  No soaking in tub, Donot apply any ointements to incision. Principal Discharge DX:	Brain mass  Goal:	strengthening, followup pathology  Instructions for follow-up, activity and diet:	Call Neurosurgery Dr MIREYA Rodgers for appointment in 1 week for wound check and staple removal.  Followup with your Private MD in 1-2 weeks.  Followup Hemo/Oncology at [facility]-call for appointment [phone]  Dr Rodgers office to setup appointment For RT with dr marrero  Return to Emergency Department or contact your Neurosurgeon if any changes in mental status, weakness, numbness or tingling of extremities; difficulty swallowing; drainage or redness of wound, fever; pain in legs; difficulty urinating or constipation.  Donot restart your Aspirin or take any Motrin/NSAIDS until checking with your Neurosurgeon.  Instructions for follow-up, activity and diet:	No strenous activity. No heavy lifting. Do not return to work until cleared by physician. No driving until cleared by physician.  You may shower on the 3rd day after you surgery.  No soaking in tub, Donot apply any ointements to incision.

## 2017-07-29 NOTE — DISCHARGE NOTE ADULT - REASON FOR ADMISSION
brain mass transfer from   61 [facility] with no history of cancer p/w "spatial disorientation," "difficulty walking" and forgetting basic tasks like typing, progressive over 6 weeks. She went to  where CTH shows approximately 3x4cm right parietal cystic lesion. She was given 10mg decadron and 500 mg Keppra and transferred to [facility] for further evaluation. On arrival, patient states that over the past several weeks she has experienced forgetting how to fold clothes and type on the keyboard at times, causing her some concern. She states that her short term memory has been progressively worse and she has developed bilateral tremor for the past year. She denies fevers, chills, weight loss/gain, history of cancer, weakness, numbness, tingling, seizures.

## 2017-07-29 NOTE — DISCHARGE NOTE ADULT - CARE PROVIDERS DIRECT ADDRESSES
[email or web address]

## 2017-07-29 NOTE — DISCHARGE NOTE ADULT - CARE PROVIDER_API CALL
Yovani Rodgers), Neurological Surgery  [address] [place] [address]  Phone: [phone]  Fax: [phone]    Benjy Melissa), Therapeutic Radiology  [address] [place] [address]  Phone: [phone]  Fax: [phone]

## 2017-07-29 NOTE — PROGRESS NOTE ADULT - SUBJECTIVE AND OBJECTIVE BOX
SUBJECTIVE: In chair, some pain, NAD    OVERNIGHT EVENTS: none    Vital Signs Last 24 Hrs  T(C): 20 (29 Jul 2017 07:55), Max: 37.2 (28 Jul 2017 21:00)  T(F): 68 (29 Jul 2017 07:55), Max: 99 (28 Jul 2017 21:00)  HR: 68 (29 Jul 2017 07:55) (68 - 102)  BP: 144/88 (29 Jul 2017 07:55) (122/67 - 144/88)  BP(mean): 99 (28 Jul 2017 12:00) (90 - 99)  RR: 18 (29 Jul 2017 07:55) (16 - 20)  SpO2: 97% (29 Jul 2017 07:55) (90% - 99%)  IVF: [X ] IVL   DIET: [ X] Regular   +voiding  PCA: [ ] YES [ X] NO     DRAINS: none    PHYSICAL EXAM:    General: No Acute Distress     Neurological: AOx3, FC, PERRL, EOMI, GRANADOS 5/5 throughout, no drift. SILT. No clonus or babinski.     Pulmonary: Clear to Auscultation, No Rales, No Rhonchi, No Wheezes     Cardiovascular: S1, S2, Regular Rate and Rhythm     Gastrointestinal: Soft, Nontender, Nondistended     Extremities: No calf tenderness     Incision: +staples, CDI. sl boggy over incision    LABS:                          13.4   8.40  )-----------( 272      ( 25 Jul 2017 07:41 )             39.0    07-25    134<L>  |  96  |  18  ----------------------------<  190<H>  4.4   |  20<L>  |  0.65    Ca    9.2      25 Jul 2017 07:35  Phos  4.0     07-24  Mg     2.2     07-24    TPro  7.3  /  Alb  4.3  /  TBili  0.3  /  DBili  x   /  AST  21  /  ALT  32  /  AlkPhos  73  07-24  PT/INR - ( 24 Jul 2017 22:27 )   PT: 10.8 sec;   INR: 0.99 ratio         PTT - ( 24 Jul 2017 22:27 )  PTT:28.4 sec      IMAGING: CT Chest, Abdomen and Pelvis w/ Oral Cont and w/ IV Cont (07.24.17 @ 23:54) >  No primary or metastatic disease.    4 mm nonspecific left lung nodule, and the presence of risk factors 6-12   months follow-up low dose CT may be considered.    MRI Head w/wo Cont (07.25.17 @ 09:24) >  Right parietal lobe solid and cystic mass with a small amount of   surrounding edema. Differential diagnostic considerations include primary   versus secondary neoplasm.    CT Head No Cont (07.28.17 @ 08:35) >  Status post resection of a right parietal lobe mass, with postoperative   changes edema and minimal parenchymal hemorrhage with local mass effect.     < end of copied text >        MEDICATIONS  (STANDING):  FLUoxetine 10 milliGRAM(s) Oral daily  levETIRAcetam 500 milliGRAM(s) Oral every 12 hours  docusate sodium 100 milliGRAM(s) Oral three times a day  buDESOnide  80 MICROgram(s)/formoterol 4.5 MICROgram(s) Inhaler 2 Puff(s) Inhalation two times a day    MEDICATIONS  (PRN):  acetaminophen   Tablet 650 milliGRAM(s) Oral every 6 hours PRN For Temp greater than 38 C (100.4 F)  acetaminophen   Tablet. 650 milliGRAM(s) Oral every 6 hours PRN Mild Pain (1 - 3)  ondansetron Injectable 4 milliGRAM(s) IV Push every 6 hours PRN Nausea and/or Vomiting  senna 2 Tablet(s) Oral at bedtime PRN Constipation

## 2017-08-01 LAB — SURGICAL PATHOLOGY STUDY: SIGNIFICANT CHANGE UP

## 2017-08-02 ENCOUNTER — APPOINTMENT (OUTPATIENT)
Dept: NEUROSURGERY | Facility: CLINIC | Age: 62
End: 2017-08-02
Payer: COMMERCIAL

## 2017-08-02 ENCOUNTER — OUTPATIENT (OUTPATIENT)
Dept: OUTPATIENT SERVICES | Facility: HOSPITAL | Age: 62
LOS: 1 days | Discharge: HOME | End: 2017-08-02

## 2017-08-02 VITALS
WEIGHT: 185 LBS | DIASTOLIC BLOOD PRESSURE: 84 MMHG | HEART RATE: 61 BPM | TEMPERATURE: 98.6 F | OXYGEN SATURATION: 95 % | BODY MASS INDEX: 34.93 KG/M2 | SYSTOLIC BLOOD PRESSURE: 148 MMHG | HEIGHT: 61 IN

## 2017-08-02 DIAGNOSIS — Z98.891 HISTORY OF UTERINE SCAR FROM PREVIOUS SURGERY: Chronic | ICD-10-CM

## 2017-08-02 PROCEDURE — 99024 POSTOP FOLLOW-UP VISIT: CPT

## 2017-08-03 ENCOUNTER — APPOINTMENT (OUTPATIENT)
Dept: RADIATION ONCOLOGY | Facility: CLINIC | Age: 62
End: 2017-08-03
Payer: COMMERCIAL

## 2017-08-03 VITALS
WEIGHT: 194.66 LBS | RESPIRATION RATE: 15 BRPM | OXYGEN SATURATION: 96 % | SYSTOLIC BLOOD PRESSURE: 158 MMHG | BODY MASS INDEX: 36.28 KG/M2 | HEART RATE: 70 BPM | DIASTOLIC BLOOD PRESSURE: 94 MMHG | HEIGHT: 61.57 IN

## 2017-08-03 PROCEDURE — 77263 THER RADIOLOGY TX PLNG CPLX: CPT

## 2017-08-03 PROCEDURE — 99244 OFF/OP CNSLTJ NEW/EST MOD 40: CPT

## 2017-08-08 ENCOUNTER — APPOINTMENT (OUTPATIENT)
Dept: INTERNAL MEDICINE | Facility: CLINIC | Age: 62
End: 2017-08-08
Payer: COMMERCIAL

## 2017-08-08 VITALS
DIASTOLIC BLOOD PRESSURE: 78 MMHG | SYSTOLIC BLOOD PRESSURE: 140 MMHG | RESPIRATION RATE: 12 BRPM | HEIGHT: 62 IN | HEART RATE: 71 BPM | WEIGHT: 198 LBS | BODY MASS INDEX: 36.44 KG/M2 | OXYGEN SATURATION: 98 %

## 2017-08-08 DIAGNOSIS — Z09 ENCOUNTER FOR FOLLOW-UP EXAMINATION AFTER COMPLETED TREATMENT FOR CONDITIONS OTHER THAN MALIGNANT NEOPLASM: ICD-10-CM

## 2017-08-08 PROCEDURE — 99214 OFFICE O/P EST MOD 30 MIN: CPT

## 2017-08-09 PROCEDURE — 77470 SPECIAL RADIATION TREATMENT: CPT | Mod: 26

## 2017-08-14 ENCOUNTER — APPOINTMENT (OUTPATIENT)
Dept: HEMATOLOGY ONCOLOGY | Facility: CLINIC | Age: 62
End: 2017-08-14
Payer: COMMERCIAL

## 2017-08-14 ENCOUNTER — RESULT REVIEW (OUTPATIENT)
Age: 62
End: 2017-08-14

## 2017-08-14 ENCOUNTER — OUTPATIENT (OUTPATIENT)
Dept: OUTPATIENT SERVICES | Facility: HOSPITAL | Age: 62
LOS: 1 days | Discharge: HOME | End: 2017-08-14

## 2017-08-14 VITALS
HEART RATE: 77 BPM | HEIGHT: 61.14 IN | OXYGEN SATURATION: 96 % | SYSTOLIC BLOOD PRESSURE: 147 MMHG | TEMPERATURE: 98.5 F | DIASTOLIC BLOOD PRESSURE: 94 MMHG | WEIGHT: 197.31 LBS | BODY MASS INDEX: 37.25 KG/M2 | RESPIRATION RATE: 16 BRPM

## 2017-08-14 DIAGNOSIS — Z98.891 HISTORY OF UTERINE SCAR FROM PREVIOUS SURGERY: Chronic | ICD-10-CM

## 2017-08-14 DIAGNOSIS — C72.9 MALIGNANT NEOPLASM OF CENTRAL NERVOUS SYSTEM, UNSPECIFIED: ICD-10-CM

## 2017-08-14 DIAGNOSIS — Z87.891 PERSONAL HISTORY OF NICOTINE DEPENDENCE: ICD-10-CM

## 2017-08-14 LAB
HCT VFR BLD CALC: 36.5 % — SIGNIFICANT CHANGE UP (ref 34.5–45)
HGB BLD-MCNC: 12.7 G/DL — SIGNIFICANT CHANGE UP (ref 11.5–15.5)
MCHC RBC-ENTMCNC: 34.7 PG — HIGH (ref 27–34)
MCHC RBC-ENTMCNC: 34.9 G/DL — SIGNIFICANT CHANGE UP (ref 32–36)
MCV RBC AUTO: 99.4 FL — SIGNIFICANT CHANGE UP (ref 80–100)
PLATELET # BLD AUTO: 256 K/UL — SIGNIFICANT CHANGE UP (ref 150–400)
RBC # BLD: 3.68 M/UL — LOW (ref 3.8–5.2)
RBC # FLD: 11.9 % — SIGNIFICANT CHANGE UP (ref 10.3–14.5)
WBC # BLD: 6.4 K/UL — SIGNIFICANT CHANGE UP (ref 3.8–10.5)
WBC # FLD AUTO: 6.4 K/UL — SIGNIFICANT CHANGE UP (ref 3.8–10.5)

## 2017-08-14 PROCEDURE — 99244 OFF/OP CNSLTJ NEW/EST MOD 40: CPT

## 2017-08-14 RX ORDER — DEXAMETHASONE 2 MG/1
2 TABLET ORAL
Qty: 40 | Refills: 0 | Status: DISCONTINUED | COMMUNITY
Start: 2017-07-29 | End: 2017-08-14

## 2017-08-14 RX ORDER — OXYCODONE 5 MG/1
5 TABLET ORAL
Qty: 42 | Refills: 0 | Status: DISCONTINUED | COMMUNITY
Start: 2017-07-29

## 2017-08-15 DIAGNOSIS — C71.9 MALIGNANT NEOPLASM OF BRAIN, UNSPECIFIED: ICD-10-CM

## 2017-08-15 LAB
HAV IGM SER QL: NONREACTIVE
HBV CORE IGM SER QL: NONREACTIVE
HBV SURFACE AG SER QL: NONREACTIVE
HCV AB SER QL: NONREACTIVE
HCV S/CO RATIO: 0.3 S/CO

## 2017-08-16 LAB
ALBUMIN SERPL ELPH-MCNC: 4 G/DL
ALP BLD-CCNC: 63 U/L
ALT SERPL-CCNC: 29 U/L
ANION GAP SERPL CALC-SCNC: 18 MMOL/L
AST SERPL-CCNC: 14 U/L
BASOPHILS # BLD AUTO: 0.04 K/UL
BASOPHILS NFR BLD AUTO: 0.3 %
BILIRUB SERPL-MCNC: 0.4 MG/DL
BUN SERPL-MCNC: 19 MG/DL
CALCIUM SERPL-MCNC: 8.9 MG/DL
CHLORIDE SERPL-SCNC: 98 MMOL/L
CO2 SERPL-SCNC: 25 MMOL/L
CREAT SERPL-MCNC: 0.62 MG/DL
EOSINOPHIL # BLD AUTO: 0.16 K/UL
EOSINOPHIL NFR BLD AUTO: 1.4 %
GLUCOSE SERPL-MCNC: 94 MG/DL
HCT VFR BLD CALC: 39.1 %
HGB BLD-MCNC: 13.1 G/DL
IMM GRANULOCYTES NFR BLD AUTO: 0.5 %
LYMPHOCYTES # BLD AUTO: 3.34 K/UL
LYMPHOCYTES NFR BLD AUTO: 28.2 %
MAN DIFF?: NORMAL
MCHC RBC-ENTMCNC: 32.6 PG
MCHC RBC-ENTMCNC: 33.5 GM/DL
MCV RBC AUTO: 97.3 FL
MONOCYTES # BLD AUTO: 0.71 K/UL
MONOCYTES NFR BLD AUTO: 6 %
NEUTROPHILS # BLD AUTO: 7.52 K/UL
NEUTROPHILS NFR BLD AUTO: 63.6 %
PLATELET # BLD AUTO: 339 K/UL
POTASSIUM SERPL-SCNC: 4.4 MMOL/L
PROT SERPL-MCNC: 6.8 G/DL
RBC # BLD: 4.02 M/UL
RBC # FLD: 13.4 %
SODIUM SERPL-SCNC: 141 MMOL/L
WBC # FLD AUTO: 11.83 K/UL

## 2017-08-17 PROCEDURE — 77301 RADIOTHERAPY DOSE PLAN IMRT: CPT | Mod: 26

## 2017-08-17 PROCEDURE — 77300 RADIATION THERAPY DOSE PLAN: CPT | Mod: 26

## 2017-08-17 PROCEDURE — 77338 DESIGN MLC DEVICE FOR IMRT: CPT | Mod: 26

## 2017-08-22 DIAGNOSIS — G47.00 INSOMNIA, UNSPECIFIED: ICD-10-CM

## 2017-08-23 PROCEDURE — 77387B: CUSTOM | Mod: 26

## 2017-08-24 PROCEDURE — 77387B: CUSTOM | Mod: 26

## 2017-08-25 PROCEDURE — 77387B: CUSTOM | Mod: 26

## 2017-08-28 ENCOUNTER — RESULT REVIEW (OUTPATIENT)
Age: 62
End: 2017-08-28

## 2017-08-28 ENCOUNTER — APPOINTMENT (OUTPATIENT)
Dept: HEMATOLOGY ONCOLOGY | Facility: CLINIC | Age: 62
End: 2017-08-28
Payer: COMMERCIAL

## 2017-08-28 VITALS
WEIGHT: 199.51 LBS | DIASTOLIC BLOOD PRESSURE: 84 MMHG | RESPIRATION RATE: 16 BRPM | HEART RATE: 80 BPM | OXYGEN SATURATION: 98 % | SYSTOLIC BLOOD PRESSURE: 130 MMHG | BODY MASS INDEX: 37.52 KG/M2

## 2017-08-28 LAB
HCT VFR BLD CALC: 38.5 % — SIGNIFICANT CHANGE UP (ref 34.5–45)
HGB BLD-MCNC: 13.6 G/DL — SIGNIFICANT CHANGE UP (ref 11.5–15.5)
MCHC RBC-ENTMCNC: 34.8 PG — HIGH (ref 27–34)
MCHC RBC-ENTMCNC: 35.3 G/DL — SIGNIFICANT CHANGE UP (ref 32–36)
MCV RBC AUTO: 98.4 FL — SIGNIFICANT CHANGE UP (ref 80–100)
PLATELET # BLD AUTO: 331 K/UL — SIGNIFICANT CHANGE UP (ref 150–400)
RBC # BLD: 3.92 M/UL — SIGNIFICANT CHANGE UP (ref 3.8–5.2)
RBC # FLD: 11.9 % — SIGNIFICANT CHANGE UP (ref 10.3–14.5)
WBC # BLD: 7 K/UL — SIGNIFICANT CHANGE UP (ref 3.8–10.5)
WBC # FLD AUTO: 7 K/UL — SIGNIFICANT CHANGE UP (ref 3.8–10.5)

## 2017-08-28 PROCEDURE — 77387B: CUSTOM | Mod: 26

## 2017-08-28 PROCEDURE — 99215 OFFICE O/P EST HI 40 MIN: CPT | Mod: 25

## 2017-08-29 PROCEDURE — 77387B: CUSTOM | Mod: 26

## 2017-08-29 PROCEDURE — 77427 RADIATION TX MANAGEMENT X5: CPT

## 2017-08-30 PROCEDURE — 77387B: CUSTOM | Mod: 26

## 2017-08-31 PROCEDURE — 77387B: CUSTOM | Mod: 26

## 2017-09-01 PROCEDURE — 77387B: CUSTOM | Mod: 26

## 2017-09-05 PROCEDURE — 77387B: CUSTOM | Mod: 26

## 2017-09-06 ENCOUNTER — APPOINTMENT (OUTPATIENT)
Dept: NEUROSURGERY | Facility: CLINIC | Age: 62
End: 2017-09-06
Payer: COMMERCIAL

## 2017-09-06 VITALS
SYSTOLIC BLOOD PRESSURE: 134 MMHG | OXYGEN SATURATION: 95 % | DIASTOLIC BLOOD PRESSURE: 69 MMHG | RESPIRATION RATE: 16 BRPM | HEART RATE: 85 BPM

## 2017-09-06 PROCEDURE — 77427 RADIATION TX MANAGEMENT X5: CPT

## 2017-09-06 PROCEDURE — 99024 POSTOP FOLLOW-UP VISIT: CPT

## 2017-09-06 PROCEDURE — 77387B: CUSTOM | Mod: 26

## 2017-09-07 PROCEDURE — 77387B: CUSTOM | Mod: 26

## 2017-09-08 PROCEDURE — 77387B: CUSTOM | Mod: 26

## 2017-09-11 ENCOUNTER — MEDICATION RENEWAL (OUTPATIENT)
Age: 62
End: 2017-09-11

## 2017-09-11 VITALS
OXYGEN SATURATION: 97 % | DIASTOLIC BLOOD PRESSURE: 85 MMHG | RESPIRATION RATE: 20 BRPM | HEART RATE: 87 BPM | SYSTOLIC BLOOD PRESSURE: 137 MMHG

## 2017-09-11 PROCEDURE — 77387B: CUSTOM | Mod: 26

## 2017-09-12 PROCEDURE — 77387B: CUSTOM | Mod: 26

## 2017-09-13 ENCOUNTER — APPOINTMENT (OUTPATIENT)
Dept: HEMATOLOGY ONCOLOGY | Facility: CLINIC | Age: 62
End: 2017-09-13
Payer: COMMERCIAL

## 2017-09-13 ENCOUNTER — RESULT REVIEW (OUTPATIENT)
Age: 62
End: 2017-09-13

## 2017-09-13 VITALS
DIASTOLIC BLOOD PRESSURE: 96 MMHG | HEART RATE: 87 BPM | WEIGHT: 197.31 LBS | BODY MASS INDEX: 37.11 KG/M2 | OXYGEN SATURATION: 95 % | RESPIRATION RATE: 16 BRPM | SYSTOLIC BLOOD PRESSURE: 144 MMHG | TEMPERATURE: 98.7 F

## 2017-09-13 DIAGNOSIS — Z87.09 PERSONAL HISTORY OF OTHER DISEASES OF THE RESPIRATORY SYSTEM: ICD-10-CM

## 2017-09-13 LAB
ALBUMIN SERPL ELPH-MCNC: 4.1 G/DL
ALP BLD-CCNC: 74 U/L
ALT SERPL-CCNC: 41 U/L
ANION GAP SERPL CALC-SCNC: 17 MMOL/L
AST SERPL-CCNC: 27 U/L
BILIRUB SERPL-MCNC: 0.2 MG/DL
BUN SERPL-MCNC: 16 MG/DL
CALCIUM SERPL-MCNC: 9.4 MG/DL
CHLORIDE SERPL-SCNC: 101 MMOL/L
CO2 SERPL-SCNC: 25 MMOL/L
CREAT SERPL-MCNC: 0.74 MG/DL
GLUCOSE SERPL-MCNC: 101 MG/DL
HCT VFR BLD CALC: 40 % — SIGNIFICANT CHANGE UP (ref 34.5–45)
HGB BLD-MCNC: 13.5 G/DL — SIGNIFICANT CHANGE UP (ref 11.5–15.5)
MCHC RBC-ENTMCNC: 33.6 PG — SIGNIFICANT CHANGE UP (ref 27–34)
MCHC RBC-ENTMCNC: 33.7 G/DL — SIGNIFICANT CHANGE UP (ref 32–36)
MCV RBC AUTO: 99.7 FL — SIGNIFICANT CHANGE UP (ref 80–100)
PLATELET # BLD AUTO: 238 K/UL — SIGNIFICANT CHANGE UP (ref 150–400)
POTASSIUM SERPL-SCNC: 5 MMOL/L
PROT SERPL-MCNC: 7 G/DL
RBC # BLD: 4.01 M/UL — SIGNIFICANT CHANGE UP (ref 3.8–5.2)
RBC # FLD: 12.3 % — SIGNIFICANT CHANGE UP (ref 10.3–14.5)
SODIUM SERPL-SCNC: 143 MMOL/L
WBC # BLD: 6.1 K/UL — SIGNIFICANT CHANGE UP (ref 3.8–10.5)
WBC # FLD AUTO: 6.1 K/UL — SIGNIFICANT CHANGE UP (ref 3.8–10.5)

## 2017-09-13 PROCEDURE — 77387B: CUSTOM | Mod: 26

## 2017-09-13 PROCEDURE — 99215 OFFICE O/P EST HI 40 MIN: CPT

## 2017-09-13 PROCEDURE — 77427 RADIATION TX MANAGEMENT X5: CPT

## 2017-09-14 PROCEDURE — 77387B: CUSTOM | Mod: 26

## 2017-09-15 PROCEDURE — 77387B: CUSTOM | Mod: 26

## 2017-09-18 ENCOUNTER — CLINICAL ADVICE (OUTPATIENT)
Age: 62
End: 2017-09-18

## 2017-09-18 VITALS
DIASTOLIC BLOOD PRESSURE: 77 MMHG | SYSTOLIC BLOOD PRESSURE: 131 MMHG | HEART RATE: 69 BPM | BODY MASS INDEX: 16.84 KG/M2 | RESPIRATION RATE: 16 BRPM | WEIGHT: 89.55 LBS

## 2017-09-18 PROCEDURE — 77387B: CUSTOM | Mod: 26

## 2017-09-19 PROCEDURE — 77387B: CUSTOM | Mod: 26

## 2017-09-20 PROCEDURE — 77387B: CUSTOM | Mod: 26

## 2017-09-20 PROCEDURE — 77427 RADIATION TX MANAGEMENT X5: CPT

## 2017-09-21 PROCEDURE — 77387B: CUSTOM | Mod: 26

## 2017-09-22 PROCEDURE — 77387B: CUSTOM | Mod: 26

## 2017-09-25 VITALS
SYSTOLIC BLOOD PRESSURE: 149 MMHG | WEIGHT: 196.65 LBS | HEART RATE: 72 BPM | DIASTOLIC BLOOD PRESSURE: 91 MMHG | BODY MASS INDEX: 36.98 KG/M2 | RESPIRATION RATE: 16 BRPM

## 2017-09-25 PROCEDURE — 77387B: CUSTOM | Mod: 26

## 2017-09-26 PROCEDURE — 77387B: CUSTOM | Mod: 26

## 2017-09-27 PROCEDURE — 77427 RADIATION TX MANAGEMENT X5: CPT

## 2017-09-27 PROCEDURE — 77387B: CUSTOM | Mod: 26

## 2017-09-28 PROCEDURE — 77387B: CUSTOM | Mod: 26

## 2017-09-29 ENCOUNTER — MEDICATION RENEWAL (OUTPATIENT)
Age: 62
End: 2017-09-29

## 2017-09-29 PROCEDURE — 77387B: CUSTOM | Mod: 26

## 2017-10-02 ENCOUNTER — APPOINTMENT (OUTPATIENT)
Dept: INTERNAL MEDICINE | Facility: CLINIC | Age: 62
End: 2017-10-02
Payer: COMMERCIAL

## 2017-10-02 VITALS
SYSTOLIC BLOOD PRESSURE: 164 MMHG | RESPIRATION RATE: 18 BRPM | HEART RATE: 70 BPM | DIASTOLIC BLOOD PRESSURE: 94 MMHG | OXYGEN SATURATION: 98 %

## 2017-10-02 DIAGNOSIS — Z23 ENCOUNTER FOR IMMUNIZATION: ICD-10-CM

## 2017-10-02 PROCEDURE — G0008: CPT

## 2017-10-02 PROCEDURE — 90686 IIV4 VACC NO PRSV 0.5 ML IM: CPT

## 2017-10-02 PROCEDURE — 77387B: CUSTOM | Mod: 26

## 2017-10-03 PROCEDURE — 77387B: CUSTOM | Mod: 26

## 2017-10-04 PROCEDURE — 77387B: CUSTOM | Mod: 26

## 2017-10-04 PROCEDURE — 77427 RADIATION TX MANAGEMENT X5: CPT

## 2017-10-06 ENCOUNTER — OUTPATIENT (OUTPATIENT)
Dept: OUTPATIENT SERVICES | Facility: HOSPITAL | Age: 62
LOS: 1 days | Discharge: HOME | End: 2017-10-06

## 2017-10-06 DIAGNOSIS — C71.9 MALIGNANT NEOPLASM OF BRAIN, UNSPECIFIED: ICD-10-CM

## 2017-10-06 DIAGNOSIS — Z98.891 HISTORY OF UTERINE SCAR FROM PREVIOUS SURGERY: Chronic | ICD-10-CM

## 2017-10-10 ENCOUNTER — RESULT REVIEW (OUTPATIENT)
Age: 62
End: 2017-10-10

## 2017-10-10 ENCOUNTER — APPOINTMENT (OUTPATIENT)
Dept: HEMATOLOGY ONCOLOGY | Facility: CLINIC | Age: 62
End: 2017-10-10
Payer: COMMERCIAL

## 2017-10-10 VITALS
OXYGEN SATURATION: 96 % | WEIGHT: 198.41 LBS | SYSTOLIC BLOOD PRESSURE: 142 MMHG | RESPIRATION RATE: 16 BRPM | BODY MASS INDEX: 37.32 KG/M2 | DIASTOLIC BLOOD PRESSURE: 88 MMHG | TEMPERATURE: 98.5 F | HEART RATE: 77 BPM

## 2017-10-10 LAB
HCT VFR BLD CALC: 38.1 % — SIGNIFICANT CHANGE UP (ref 34.5–45)
HGB BLD-MCNC: 13.2 G/DL — SIGNIFICANT CHANGE UP (ref 11.5–15.5)
MCHC RBC-ENTMCNC: 34.7 G/DL — SIGNIFICANT CHANGE UP (ref 32–36)
MCHC RBC-ENTMCNC: 35 PG — HIGH (ref 27–34)
MCV RBC AUTO: 101 FL — HIGH (ref 80–100)
PLATELET # BLD AUTO: 174 K/UL — SIGNIFICANT CHANGE UP (ref 150–400)
RBC # BLD: 3.78 M/UL — LOW (ref 3.8–5.2)
RBC # FLD: 13.2 % — SIGNIFICANT CHANGE UP (ref 10.3–14.5)
WBC # BLD: 5.3 K/UL — SIGNIFICANT CHANGE UP (ref 3.8–10.5)
WBC # FLD AUTO: 5.3 K/UL — SIGNIFICANT CHANGE UP (ref 3.8–10.5)

## 2017-10-10 PROCEDURE — 99215 OFFICE O/P EST HI 40 MIN: CPT

## 2017-10-10 RX ORDER — LEVETIRACETAM 500 MG/1
500 TABLET, FILM COATED ORAL
Qty: 20 | Refills: 0 | Status: DISCONTINUED | COMMUNITY
Start: 2017-07-29 | End: 2017-10-10

## 2017-10-10 RX ORDER — SULFAMETHOXAZOLE AND TRIMETHOPRIM 800; 160 MG/1; MG/1
800-160 TABLET ORAL
Qty: 12 | Refills: 0 | Status: DISCONTINUED | COMMUNITY
Start: 2017-09-13 | End: 2017-10-10

## 2017-10-11 LAB
ALBUMIN SERPL ELPH-MCNC: 4.1 G/DL
ALP BLD-CCNC: 72 U/L
ALT SERPL-CCNC: 23 U/L
ANION GAP SERPL CALC-SCNC: 14 MMOL/L
AST SERPL-CCNC: 16 U/L
BILIRUB SERPL-MCNC: 0.2 MG/DL
BUN SERPL-MCNC: 22 MG/DL
CALCIUM SERPL-MCNC: 9.5 MG/DL
CHLORIDE SERPL-SCNC: 101 MMOL/L
CO2 SERPL-SCNC: 27 MMOL/L
CREAT SERPL-MCNC: 0.76 MG/DL
GLUCOSE SERPL-MCNC: 121 MG/DL
POTASSIUM SERPL-SCNC: 4.4 MMOL/L
PROT SERPL-MCNC: 6.8 G/DL
SODIUM SERPL-SCNC: 142 MMOL/L

## 2017-11-12 ENCOUNTER — FORM ENCOUNTER (OUTPATIENT)
Age: 62
End: 2017-11-12

## 2017-11-13 ENCOUNTER — APPOINTMENT (OUTPATIENT)
Dept: MRI IMAGING | Facility: CLINIC | Age: 62
End: 2017-11-13
Payer: COMMERCIAL

## 2017-11-13 ENCOUNTER — OUTPATIENT (OUTPATIENT)
Dept: OUTPATIENT SERVICES | Facility: HOSPITAL | Age: 62
LOS: 1 days | Discharge: HOME | End: 2017-11-13

## 2017-11-13 ENCOUNTER — OUTPATIENT (OUTPATIENT)
Dept: OUTPATIENT SERVICES | Facility: HOSPITAL | Age: 62
LOS: 1 days | End: 2017-11-13
Payer: COMMERCIAL

## 2017-11-13 DIAGNOSIS — Z98.891 HISTORY OF UTERINE SCAR FROM PREVIOUS SURGERY: Chronic | ICD-10-CM

## 2017-11-13 DIAGNOSIS — C71.9 MALIGNANT NEOPLASM OF BRAIN, UNSPECIFIED: ICD-10-CM

## 2017-11-13 DIAGNOSIS — Z00.8 ENCOUNTER FOR OTHER GENERAL EXAMINATION: ICD-10-CM

## 2017-11-13 PROCEDURE — 70553 MRI BRAIN STEM W/O & W/DYE: CPT | Mod: 26

## 2017-11-13 PROCEDURE — A9585: CPT

## 2017-11-13 PROCEDURE — 70553 MRI BRAIN STEM W/O & W/DYE: CPT

## 2017-11-15 ENCOUNTER — RESULT REVIEW (OUTPATIENT)
Age: 62
End: 2017-11-15

## 2017-11-15 ENCOUNTER — INPATIENT (INPATIENT)
Facility: HOSPITAL | Age: 62
LOS: 4 days | Discharge: HOME | DRG: 27 | End: 2017-11-20
Attending: NEUROLOGICAL SURGERY | Admitting: NEUROLOGICAL SURGERY
Payer: COMMERCIAL

## 2017-11-15 ENCOUNTER — APPOINTMENT (OUTPATIENT)
Dept: HEMATOLOGY ONCOLOGY | Facility: CLINIC | Age: 62
End: 2017-11-15
Payer: COMMERCIAL

## 2017-11-15 ENCOUNTER — APPOINTMENT (OUTPATIENT)
Dept: NEUROSURGERY | Facility: CLINIC | Age: 62
End: 2017-11-15
Payer: COMMERCIAL

## 2017-11-15 ENCOUNTER — APPOINTMENT (OUTPATIENT)
Dept: RADIATION ONCOLOGY | Facility: CLINIC | Age: 62
End: 2017-11-15
Payer: COMMERCIAL

## 2017-11-15 VITALS
SYSTOLIC BLOOD PRESSURE: 151 MMHG | OXYGEN SATURATION: 95 % | BODY MASS INDEX: 35.87 KG/M2 | TEMPERATURE: 98.3 F | DIASTOLIC BLOOD PRESSURE: 89 MMHG | WEIGHT: 190 LBS | HEART RATE: 76 BPM | HEIGHT: 61 IN

## 2017-11-15 VITALS
WEIGHT: 189.82 LBS | HEART RATE: 76 BPM | DIASTOLIC BLOOD PRESSURE: 99 MMHG | TEMPERATURE: 98.8 F | OXYGEN SATURATION: 95 % | SYSTOLIC BLOOD PRESSURE: 150 MMHG | RESPIRATION RATE: 15 BRPM | BODY MASS INDEX: 35.7 KG/M2

## 2017-11-15 VITALS
HEIGHT: 61 IN | HEART RATE: 74 BPM | DIASTOLIC BLOOD PRESSURE: 97 MMHG | RESPIRATION RATE: 16 BRPM | WEIGHT: 190.04 LBS | SYSTOLIC BLOOD PRESSURE: 158 MMHG | TEMPERATURE: 98 F | OXYGEN SATURATION: 97 %

## 2017-11-15 VITALS
WEIGHT: 196.21 LBS | BODY MASS INDEX: 36.9 KG/M2 | DIASTOLIC BLOOD PRESSURE: 80 MMHG | OXYGEN SATURATION: 97 % | HEART RATE: 87 BPM | SYSTOLIC BLOOD PRESSURE: 126 MMHG | RESPIRATION RATE: 16 BRPM | TEMPERATURE: 98.9 F

## 2017-11-15 DIAGNOSIS — I49.9 CARDIAC ARRHYTHMIA, UNSPECIFIED: ICD-10-CM

## 2017-11-15 DIAGNOSIS — F32.9 MAJOR DEPRESSIVE DISORDER, SINGLE EPISODE, UNSPECIFIED: ICD-10-CM

## 2017-11-15 DIAGNOSIS — J45.909 UNSPECIFIED ASTHMA, UNCOMPLICATED: ICD-10-CM

## 2017-11-15 DIAGNOSIS — C71.9 MALIGNANT NEOPLASM OF BRAIN, UNSPECIFIED: ICD-10-CM

## 2017-11-15 DIAGNOSIS — R91.1 SOLITARY PULMONARY NODULE: ICD-10-CM

## 2017-11-15 DIAGNOSIS — G93.6 CEREBRAL EDEMA: ICD-10-CM

## 2017-11-15 DIAGNOSIS — Z98.891 HISTORY OF UTERINE SCAR FROM PREVIOUS SURGERY: Chronic | ICD-10-CM

## 2017-11-15 DIAGNOSIS — G93.9 DISORDER OF BRAIN, UNSPECIFIED: ICD-10-CM

## 2017-11-15 LAB
ALBUMIN SERPL ELPH-MCNC: 4.7 G/DL — SIGNIFICANT CHANGE UP (ref 3.3–5)
ALP SERPL-CCNC: 68 U/L — SIGNIFICANT CHANGE UP (ref 40–120)
ALT FLD-CCNC: 32 U/L RC — SIGNIFICANT CHANGE UP (ref 10–45)
ANION GAP SERPL CALC-SCNC: 14 MMOL/L — SIGNIFICANT CHANGE UP (ref 5–17)
APTT BLD: 26.6 SEC — LOW (ref 27.5–37.4)
AST SERPL-CCNC: 23 U/L — SIGNIFICANT CHANGE UP (ref 10–40)
BASOPHILS # BLD AUTO: 0 K/UL — SIGNIFICANT CHANGE UP (ref 0–0.2)
BASOPHILS NFR BLD AUTO: 0.8 % — SIGNIFICANT CHANGE UP (ref 0–2)
BILIRUB SERPL-MCNC: 0.2 MG/DL — SIGNIFICANT CHANGE UP (ref 0.2–1.2)
BLD GP AB SCN SERPL QL: NEGATIVE — SIGNIFICANT CHANGE UP
BUN SERPL-MCNC: 17 MG/DL — SIGNIFICANT CHANGE UP (ref 7–23)
CALCIUM SERPL-MCNC: 9.2 MG/DL — SIGNIFICANT CHANGE UP (ref 8.4–10.5)
CHLORIDE SERPL-SCNC: 101 MMOL/L — SIGNIFICANT CHANGE UP (ref 96–108)
CO2 SERPL-SCNC: 26 MMOL/L — SIGNIFICANT CHANGE UP (ref 22–31)
CREAT SERPL-MCNC: 0.7 MG/DL — SIGNIFICANT CHANGE UP (ref 0.5–1.3)
EOSINOPHIL # BLD AUTO: 0.1 K/UL — SIGNIFICANT CHANGE UP (ref 0–0.5)
EOSINOPHIL NFR BLD AUTO: 2.2 % — SIGNIFICANT CHANGE UP (ref 0–6)
GLUCOSE SERPL-MCNC: 122 MG/DL — HIGH (ref 70–99)
HCT VFR BLD CALC: 38.9 % — SIGNIFICANT CHANGE UP (ref 34.5–45)
HCT VFR BLD CALC: 41.2 % — SIGNIFICANT CHANGE UP (ref 34.5–45)
HGB BLD-MCNC: 13.6 G/DL — SIGNIFICANT CHANGE UP (ref 11.5–15.5)
HGB BLD-MCNC: 13.7 G/DL — SIGNIFICANT CHANGE UP (ref 11.5–15.5)
INR BLD: 1 RATIO — SIGNIFICANT CHANGE UP (ref 0.88–1.16)
LYMPHOCYTES # BLD AUTO: 0.6 K/UL — LOW (ref 1–3.3)
LYMPHOCYTES # BLD AUTO: 12.6 % — LOW (ref 13–44)
MCHC RBC-ENTMCNC: 33.3 GM/DL — SIGNIFICANT CHANGE UP (ref 32–36)
MCHC RBC-ENTMCNC: 34.8 PG — HIGH (ref 27–34)
MCHC RBC-ENTMCNC: 35 G/DL — SIGNIFICANT CHANGE UP (ref 32–36)
MCHC RBC-ENTMCNC: 35.6 PG — HIGH (ref 27–34)
MCV RBC AUTO: 102 FL — HIGH (ref 80–100)
MCV RBC AUTO: 105 FL — HIGH (ref 80–100)
MONOCYTES # BLD AUTO: 0.3 K/UL — SIGNIFICANT CHANGE UP (ref 0–0.9)
MONOCYTES NFR BLD AUTO: 7.2 % — SIGNIFICANT CHANGE UP (ref 2–14)
NEUTROPHILS # BLD AUTO: 3.5 K/UL — SIGNIFICANT CHANGE UP (ref 1.8–7.4)
NEUTROPHILS NFR BLD AUTO: 77.3 % — HIGH (ref 43–77)
PLATELET # BLD AUTO: 234 K/UL — SIGNIFICANT CHANGE UP (ref 150–400)
PLATELET # BLD AUTO: 240 K/UL — SIGNIFICANT CHANGE UP (ref 150–400)
POTASSIUM SERPL-MCNC: 3.8 MMOL/L — SIGNIFICANT CHANGE UP (ref 3.5–5.3)
POTASSIUM SERPL-SCNC: 3.8 MMOL/L — SIGNIFICANT CHANGE UP (ref 3.5–5.3)
PROT SERPL-MCNC: 7.2 G/DL — SIGNIFICANT CHANGE UP (ref 6–8.3)
PROTHROM AB SERPL-ACNC: 10.8 SEC — SIGNIFICANT CHANGE UP (ref 9.8–12.7)
RBC # BLD: 3.82 M/UL — SIGNIFICANT CHANGE UP (ref 3.8–5.2)
RBC # BLD: 3.94 M/UL — SIGNIFICANT CHANGE UP (ref 3.8–5.2)
RBC # FLD: 12.9 % — SIGNIFICANT CHANGE UP (ref 10.3–14.5)
RBC # FLD: 13.1 % — SIGNIFICANT CHANGE UP (ref 10.3–14.5)
RH IG SCN BLD-IMP: POSITIVE — SIGNIFICANT CHANGE UP
SODIUM SERPL-SCNC: 141 MMOL/L — SIGNIFICANT CHANGE UP (ref 135–145)
WBC # BLD: 4.5 K/UL — SIGNIFICANT CHANGE UP (ref 3.8–10.5)
WBC # BLD: 5.5 K/UL — SIGNIFICANT CHANGE UP (ref 3.8–10.5)
WBC # FLD AUTO: 4.5 K/UL — SIGNIFICANT CHANGE UP (ref 3.8–10.5)
WBC # FLD AUTO: 5.5 K/UL — SIGNIFICANT CHANGE UP (ref 3.8–10.5)

## 2017-11-15 PROCEDURE — 93010 ELECTROCARDIOGRAM REPORT: CPT

## 2017-11-15 PROCEDURE — 99215 OFFICE O/P EST HI 40 MIN: CPT

## 2017-11-15 PROCEDURE — 99215 OFFICE O/P EST HI 40 MIN: CPT | Mod: 57

## 2017-11-15 PROCEDURE — 99024 POSTOP FOLLOW-UP VISIT: CPT

## 2017-11-15 PROCEDURE — 71010: CPT | Mod: 26

## 2017-11-15 PROCEDURE — 99223 1ST HOSP IP/OBS HIGH 75: CPT

## 2017-11-15 RX ORDER — ACETAMINOPHEN 500 MG
650 TABLET ORAL EVERY 6 HOURS
Qty: 0 | Refills: 0 | Status: DISCONTINUED | OUTPATIENT
Start: 2017-11-15 | End: 2017-11-17

## 2017-11-15 RX ORDER — OXYCODONE AND ACETAMINOPHEN 5; 325 MG/1; MG/1
1 TABLET ORAL EVERY 4 HOURS
Qty: 0 | Refills: 0 | Status: DISCONTINUED | OUTPATIENT
Start: 2017-11-15 | End: 2017-11-17

## 2017-11-15 RX ORDER — FLUOXETINE HCL 10 MG
10 CAPSULE ORAL DAILY
Qty: 0 | Refills: 0 | Status: DISCONTINUED | OUTPATIENT
Start: 2017-11-15 | End: 2017-11-17

## 2017-11-15 RX ORDER — LEVETIRACETAM 250 MG/1
500 TABLET, FILM COATED ORAL
Qty: 0 | Refills: 0 | Status: DISCONTINUED | OUTPATIENT
Start: 2017-11-15 | End: 2017-11-17

## 2017-11-15 RX ORDER — ONDANSETRON 8 MG/1
4 TABLET, FILM COATED ORAL EVERY 6 HOURS
Qty: 0 | Refills: 0 | Status: DISCONTINUED | OUTPATIENT
Start: 2017-11-15 | End: 2017-11-17

## 2017-11-15 RX ORDER — SENNA PLUS 8.6 MG/1
2 TABLET ORAL AT BEDTIME
Qty: 0 | Refills: 0 | Status: DISCONTINUED | OUTPATIENT
Start: 2017-11-15 | End: 2017-11-17

## 2017-11-15 RX ORDER — PANTOPRAZOLE SODIUM 20 MG/1
40 TABLET, DELAYED RELEASE ORAL
Qty: 0 | Refills: 0 | Status: DISCONTINUED | OUTPATIENT
Start: 2017-11-15 | End: 2017-11-17

## 2017-11-15 RX ORDER — DOCUSATE SODIUM 100 MG
100 CAPSULE ORAL THREE TIMES A DAY
Qty: 0 | Refills: 0 | Status: DISCONTINUED | OUTPATIENT
Start: 2017-11-15 | End: 2017-11-17

## 2017-11-15 RX ORDER — DEXAMETHASONE 0.5 MG/5ML
4 ELIXIR ORAL EVERY 6 HOURS
Qty: 0 | Refills: 0 | Status: DISCONTINUED | OUTPATIENT
Start: 2017-11-15 | End: 2017-11-17

## 2017-11-15 RX ORDER — BUDESONIDE AND FORMOTEROL FUMARATE DIHYDRATE 160; 4.5 UG/1; UG/1
2 AEROSOL RESPIRATORY (INHALATION)
Qty: 0 | Refills: 0 | Status: DISCONTINUED | OUTPATIENT
Start: 2017-11-15 | End: 2017-11-16

## 2017-11-15 RX ORDER — DEXAMETHASONE 0.5 MG/5ML
10 ELIXIR ORAL ONCE
Qty: 0 | Refills: 0 | Status: COMPLETED | OUTPATIENT
Start: 2017-11-15 | End: 2017-11-15

## 2017-11-15 RX ADMIN — LEVETIRACETAM 500 MILLIGRAM(S): 250 TABLET, FILM COATED ORAL at 18:06

## 2017-11-15 RX ADMIN — Medication 102 MILLIGRAM(S): at 18:23

## 2017-11-15 RX ADMIN — Medication 4 MILLIGRAM(S): at 20:26

## 2017-11-15 NOTE — CONSULT NOTE ADULT - SUBJECTIVE AND OBJECTIVE BOX
This is a 62 year old woman with history of asthma, R occipital GBM dx 2017 s/p R crani on  s/p RT and 1 cycle of temodar presenting with new L sided weakness x 7 days, hospitalist consulted for pre-op clearance.  Patient was seen in the office today for 7 days of new L sided weakness with associated unsteady gait. MRI  showed recurrent R occipital lesion. Plan for surgery for Friday. Patient denies headache, nausea, vomiting, numbness, tingling.     Function: [X] Independent  [ ] Assistance  [ ] Total care  [ ] Non-ambulatory    Exercise Tolerance - walks unlimited, stairs, gardens, housework - DASI >23 METS > 4  Never had anesthesia reaction  Not on aspirin or other blood thinner - takes occasional NSAID not recently    Allergies  NKDA  shellfish (Rash)  animal dander    HOME MEDICATIONS: [X] Reviewed    MEDICATIONS  (STANDING):  FLUoxetine 10 milliGRAM(s) Oral daily  levETIRAcetam 500 milliGRAM(s) Oral every 12 hours  docusate sodium 100 milliGRAM(s) Oral three times a day  buDESOnide  80 MICROgram(s)/formoterol 4.5 MICROgram(s) Inhaler 2 Puff(s) Inhalation two times a day    MEDICATIONS  (PRN):  acetaminophen   Tablet 650 milliGRAM(s) Oral every 6 hours PRN For Temp greater than 38 C (100.4 F)  acetaminophen   Tablet. 650 milliGRAM(s) Oral every 6 hours PRN Mild Pain (1 - 3)  ondansetron Injectable 4 milliGRAM(s) IV Push every 6 hours PRN Nausea and/or Vomiting  senna 2 Tablet(s) Oral at bedtime PRN Constipation    PAST MEDICAL & SURGICAL HISTORY:  Asthma  S/P   Spinal Fusion  Hand surgery  Rotator cuff  tonsillectomy  [X] Reviewed     SOCIAL HISTORY:  Residence: [ ] [facility]  [ ] [facility]  [X] Community  [ ] Substance abuse: none  [ ] Tobacco: former 20PY quit 20 yrs ago  [ ] Alcohol use: none    FAMILY HISTORY:  Breast Ca in Mother    REVIEW OF SYSTEMS:    CONSTITUTIONAL: No fever,   EYES: No eye pain, visual disturbances, or discharge  ENMT:  No difficulty hearing, tinnitus, vertigo; No sinus or throat pain  NECK: No pain or stiffness  RESPIRATORY: No cough, wheezing, chills or hemoptysis; No shortness of breath  CARDIOVASCULAR: No chest pain, palpitations, dizziness, or leg swelling  GASTROINTESTINAL: No abdominal or epigastric pain. No nausea, vomiting, or hematemesis; No diarrhea or constipation. No melena or hematochezia.    ICU Vital Signs Last 24 Hrs  T(C): 36.9 (15 Nov 2017 16:57), Max: 36.9 (15 Nov 2017 16:57)  T(F): 98.5 (15 Nov 2017 16:57), Max: 98.5 (15 Nov 2017 16:57)  HR: 74 (15 Nov 2017 16:57) (74 - 74)  BP: 158/97 (15 Nov 2017 16:57) (158/97 - 158/97)  BP(mean): --  ABP: --  ABP(mean): --  RR: 16 (15 Nov 2017 16:57) (16 - 16)  SpO2: 97% (15 Nov 2017 16:57) (97% - 97%)    GENERAL: NAD, well-groomed, well-developed  EYES: EOMI, PERRLA, conjunctiva and sclera clear  ENMT: Moist mucous membranes  NECK: Supple, No JVD  RESPIRATORY: Clear to auscultation bilaterally; No rales, rhonchi, wheezing, or rubs  CARDIOVASCULAR: Regular rate and rhythm; No murmurs, rubs, or gallops  GASTROINTESTINAL: Soft, Nontender, Nondistended; Bowel sounds present  EXTREMITIES:  2+ Peripheral Pulses, No clubbing, cyanosis, or edema  NERVOUS SYSTEM:  Alert & Oriented X3; Moving all 4 extremities; 5/5 strength throughout, dysmetric movement L hand  SKIN: No rashes or lesions;  GENITOURINARY: No dysuria,  NEUROLOGICAL: No headaches, loss of strength, numbness, or tremors. Discoordination, disorientation, memory loss as above  SKIN: No itching, burning, rashes, or lesions   LYMPH NODES: Laryngeal lymph node has been bothering her, was ultrasounded this year not very large  MUSCULOSKELETAL: No muscle or back pain, h/o spinal fusion  PSYCHIATRIC: Depression on Prozac  [X ] All other ROS negative  [  ] Unable to obtain due to poor mental status    Labs personally reviewed.                        13.7   4.5   )-----------( 240      ( 15 Nov 2017 18:44 )             41.2     11-15    141  |  101  |  17  ----------------------------<  122<H>  3.8   |  26  |  0.70    Ca    9.2      15 Nov 2017 18:44    TPro  7.2  /  Alb  4.7  /  TBili  0.2  /  DBili  x   /  AST  23  /  ALT  32  /  AlkPhos  68  11-15    LIVER FUNCTIONS - ( 15 Nov 2017 18:44 )  Alb: 4.7 g/dL / Pro: 7.2 g/dL / ALK PHOS: 68 U/L / ALT: 32 U/L RC / AST: 23 U/L / GGT: x           PT/INR - ( 15 Nov 2017 18:44 )   PT: 10.8 sec;   INR: 1.00 ratio      PTT - ( 15 Nov 2017 18:44 )  PTT:26.6 sec    Imaging personally reviewed.      Tracing personally reviewed. This is a 62 year old woman with history of mild intermittent asthma, R occipital GBM dx 2017 s/p R crani on  s/p RT and 1 cycle of temodar presenting with new L sided weakness x 7 days, hospitalist consulted for pre-op clearance.  Patient was seen in the office today for 7 days of new L sided weakness with associated unsteady gait. MRI  showed recurrent R occipital lesion. Patient notes that she was ambulating normally prior to the onset of this new weakness, notes no limitation in exercise tolerance, able to take several flights of stairs without SOB. Patient denies headache, nausea, vomiting, numbness, tingling.     Function: [X] Independent  [ ] Assistance  [ ] Total care  [ ] Non-ambulatory    Exercise Tolerance - walks unlimited, stairs, gardens, housework - DASI >23 METS > 4  Never had anesthesia reaction  Not on aspirin or other blood thinner - takes occasional NSAID not recently    Allergies  NKDA  shellfish (Rash)  animal dander    HOME MEDICATIONS: [X] Reviewed    MEDICATIONS  (STANDING):  FLUoxetine 10 milliGRAM(s) Oral daily  levETIRAcetam 500 milliGRAM(s) Oral every 12 hours  docusate sodium 100 milliGRAM(s) Oral three times a day  buDESOnide  80 MICROgram(s)/formoterol 4.5 MICROgram(s) Inhaler 2 Puff(s) Inhalation two times a day    MEDICATIONS  (PRN):  acetaminophen   Tablet 650 milliGRAM(s) Oral every 6 hours PRN For Temp greater than 38 C (100.4 F)  acetaminophen   Tablet. 650 milliGRAM(s) Oral every 6 hours PRN Mild Pain (1 - 3)  ondansetron Injectable 4 milliGRAM(s) IV Push every 6 hours PRN Nausea and/or Vomiting  senna 2 Tablet(s) Oral at bedtime PRN Constipation    PAST MEDICAL & SURGICAL HISTORY:  Asthma  S/P   Spinal Fusion  Hand surgery  Rotator cuff  tonsillectomy  [X] Reviewed     SOCIAL HISTORY:  Residence: [ ] [facility]  [ ] SNF  [X] Community  [ ] Substance abuse: none  [ ] Tobacco: former 20PY quit 20 yrs ago  [ ] Alcohol use: none    FAMILY HISTORY:  Breast Ca in Mother    REVIEW OF SYSTEMS:    CONSTITUTIONAL: No fever,   EYES: No eye pain, visual disturbances, or discharge  ENMT:  No difficulty hearing, tinnitus, vertigo; No sinus or throat pain  NECK: No pain or stiffness  RESPIRATORY: No cough, wheezing, chills or hemoptysis; No shortness of breath  CARDIOVASCULAR: No chest pain, palpitations, dizziness, or leg swelling  GASTROINTESTINAL: No abdominal or epigastric pain. No nausea, vomiting, or hematemesis; No diarrhea or constipation. No melena or hematochezia.    ICU Vital Signs Last 24 Hrs  T(C): 36.9 (15 Nov 2017 16:57), Max: 36.9 (15 Nov 2017 16:57)  T(F): 98.5 (15 Nov 2017 16:57), Max: 98.5 (15 Nov 2017 16:57)  HR: 74 (15 Nov 2017 16:57) (74 - 74)  BP: 158/97 (15 Nov 2017 16:57) (158/97 - 158/97)  BP(mean): --  ABP: --  ABP(mean): --  RR: 16 (15 Nov 2017 16:57) (16 - 16)  SpO2: 97% (15 Nov 2017 16:57) (97% - 97%)    GENERAL: NAD, well-groomed, well-developed  EYES: EOMI, PERRLA, conjunctiva and sclera clear  ENMT: Moist mucous membranes  NECK: Supple, No JVD  RESPIRATORY: Clear to auscultation bilaterally; No rales, rhonchi, wheezing, or rubs  CARDIOVASCULAR: Regular rate and rhythm; No murmurs, rubs, or gallops  GASTROINTESTINAL: Soft, Nontender, Nondistended; Bowel sounds present  EXTREMITIES:  2+ Peripheral Pulses, No clubbing, cyanosis, or edema  NERVOUS SYSTEM:  Alert & Oriented X3; Moving all 4 extremities; 5/5 strength throughout, dysmetric movement L hand  SKIN: No rashes or lesions;  GENITOURINARY: No dysuria,  NEUROLOGICAL: No headaches, loss of strength, numbness, or tremors. Discoordination, disorientation, memory loss as above  SKIN: No itching, burning, rashes, or lesions   LYMPH NODES: Laryngeal lymph node has been bothering her, was ultrasounded this year not very large  MUSCULOSKELETAL: No muscle or back pain, h/o spinal fusion  PSYCHIATRIC: Depression on Prozac  [X ] All other ROS negative  [  ] Unable to obtain due to poor mental status    Labs personally reviewed.                        13.7   4.5   )-----------( 240      ( 15 Nov 2017 18:44 )             41.2     -15    141  |  101  |  17  ----------------------------<  122<H>  3.8   |  26  |  0.70    Ca    9.2      15 Nov 2017 18:44    TPro  7.2  /  Alb  4.7  /  TBili  0.2  /  DBili  x   /  AST  23  /  ALT  32  /  AlkPhos  68  11-15    LIVER FUNCTIONS - ( 15 Nov 2017 18:44 )  Alb: 4.7 g/dL / Pro: 7.2 g/dL / ALK PHOS: 68 U/L / ALT: 32 U/L RC / AST: 23 U/L / GGT: x           PT/INR - ( 15 Nov 2017 18:44 )   PT: 10.8 sec;   INR: 1.00 ratio      PTT - ( 15 Nov 2017 18:44 )  PTT:26.6 sec    Imaging personally reviewed.      Tracing personally reviewed. This is a 62 year old woman with history of mild intermittent asthma, R occipital GBM dx 2017 s/p R crani on  s/p RT and 1 cycle of temodar presenting with new L sided weakness x 7 days, hospitalist consulted for pre-op clearance.  Patient was seen in the office today for 7 days of new L sided weakness with associated unsteady gait. MRI  showed recurrent R occipital lesion. Patient notes that she was ambulating normally prior to the onset of this new weakness, notes no limitation in exercise tolerance, able to take several flights of stairs without SOB. Patient denies headache, nausea, vomiting, numbness, tingling.     Function: [X] Independent  [ ] Assistance  [ ] Total care  [ ] Non-ambulatory    Exercise Tolerance - walks unlimited, stairs, gardens, housework - DASI >23 METS > 4  Never had anesthesia reaction  Not on aspirin or other blood thinner - takes occasional NSAID not recently    Allergies  NKDA  shellfish (Rash)  animal dander    HOME MEDICATIONS: [X] Reviewed    MEDICATIONS  (STANDING):  FLUoxetine 10 milliGRAM(s) Oral daily  levETIRAcetam 500 milliGRAM(s) Oral every 12 hours  docusate sodium 100 milliGRAM(s) Oral three times a day  buDESOnide  80 MICROgram(s)/formoterol 4.5 MICROgram(s) Inhaler 2 Puff(s) Inhalation two times a day    MEDICATIONS  (PRN):  acetaminophen   Tablet 650 milliGRAM(s) Oral every 6 hours PRN For Temp greater than 38 C (100.4 F)  acetaminophen   Tablet. 650 milliGRAM(s) Oral every 6 hours PRN Mild Pain (1 - 3)  ondansetron Injectable 4 milliGRAM(s) IV Push every 6 hours PRN Nausea and/or Vomiting  senna 2 Tablet(s) Oral at bedtime PRN Constipation    PAST MEDICAL & SURGICAL HISTORY:  Asthma  S/P   Spinal Fusion  Hand surgery  Rotator cuff  tonsillectomy  [X] Reviewed     SOCIAL HISTORY:  Residence: [ ] [facility]  [ ] SNF  [X] Community  [ ] Substance abuse: none  [ ] Tobacco: former 20PY quit 20 yrs ago  [ ] Alcohol use: none    FAMILY HISTORY:  Breast Ca in Mother    REVIEW OF SYSTEMS:    CONSTITUTIONAL: No fever,   EYES: No eye pain, visual disturbances, or discharge  ENMT:  No difficulty hearing, tinnitus, vertigo; No sinus or throat pain  NECK: No pain or stiffness  RESPIRATORY: No cough, wheezing, chills or hemoptysis; No shortness of breath  CARDIOVASCULAR: No chest pain, palpitations, dizziness, or leg swelling  GASTROINTESTINAL: No abdominal or epigastric pain. No nausea, vomiting, or hematemesis; No diarrhea or constipation. No melena or hematochezia.    ICU Vital Signs Last 24 Hrs  T(C): 36.9 (15 Nov 2017 16:57), Max: 36.9 (15 Nov 2017 16:57)  T(F): 98.5 (15 Nov 2017 16:57), Max: 98.5 (15 Nov 2017 16:57)  HR: 74 (15 Nov 2017 16:57) (74 - 74)  BP: 158/97 (15 Nov 2017 16:57) (158/97 - 158/97)  BP(mean): --  ABP: --  ABP(mean): --  RR: 16 (15 Nov 2017 16:57) (16 - 16)  SpO2: 97% (15 Nov 2017 16:57) (97% - 97%)    GENERAL: NAD, well-groomed, well-developed  EYES: EOMI, PERRLA, conjunctiva and sclera clear  ENMT: Moist mucous membranes  NECK: Supple, No JVD  RESPIRATORY: Clear to auscultation bilaterally; No rales, rhonchi, wheezing, or rubs  CARDIOVASCULAR: Regular rate and rhythm; No murmurs, rubs, or gallops  GASTROINTESTINAL: Soft, Nontender, Nondistended; Bowel sounds present  EXTREMITIES:  2+ Peripheral Pulses, No clubbing, cyanosis, or edema  NERVOUS SYSTEM:  Alert & Oriented X3; Moving all 4 extremities; 5/5 strength throughout, dysmetric movement L hand  SKIN: No rashes or lesions;  GENITOURINARY: No dysuria,  NEUROLOGICAL: No headaches, loss of strength, numbness, or tremors. Discoordination, disorientation, memory loss as above  SKIN: No itching, burning, rashes, or lesions   LYMPH NODES: Laryngeal lymph node has been bothering her, was ultrasounded this year not very large  MUSCULOSKELETAL: No muscle or back pain, h/o spinal fusion  PSYCHIATRIC: Depression on Prozac  [X ] All other ROS negative  [  ] Unable to obtain due to poor mental status    Labs personally reviewed.                        13.7   4.5   )-----------( 240      ( 15 Nov 2017 18:44 )             41.2     -15    141  |  101  |  17  ----------------------------<  122<H>  3.8   |  26  |  0.70    Ca    9.2      15 Nov 2017 18:44    TPro  7.2  /  Alb  4.7  /  TBili  0.2  /  DBili  x   /  AST  23  /  ALT  32  /  AlkPhos  68  11-15    LIVER FUNCTIONS - ( 15 Nov 2017 18:44 )  Alb: 4.7 g/dL / Pro: 7.2 g/dL / ALK PHOS: 68 U/L / ALT: 32 U/L RC / AST: 23 U/L / GGT: x           PT/INR - ( 15 Nov 2017 18:44 )   PT: 10.8 sec;   INR: 1.00 ratio      PTT - ( 15 Nov 2017 18:44 )  PTT:26.6 sec    Imaging personally reviewed.      Tracing personally reviewed. NSR @ 77 BPM with multiple PACs, no ST changes, similar to prior

## 2017-11-15 NOTE — H&P ADULT - NSHPPHYSICALEXAM_GEN_ALL_CORE
AOx3, Following Commands    CN: PERRL, EOMI, V1-3 intact, no facial droop appreciated, hearing grossly intact, palate elevation symmetric, tongue midline, shoulder shrug 5/5, VFF    Motor: left drift, LUE 4+/5, LLE 4+/5, RUE 5/5, RLE 5/5    Sensation: intact to light touch    Reflexes: No clonus or babinski    Gait: not assessed

## 2017-11-15 NOTE — H&P ADULT - HISTORY OF PRESENT ILLNESS
62F with history of asthma, R occipital GBM s/p R crani on 7/27 s/p RT and temodar was seen in the office today for new left sided weakness since 7 days. She has associated unsteady gait. She obtained an MRI 11/13 which showed recurrent R occipital lesion. She was admitted to hospital for worsening symptoms and planned surgery for Friday. Patient denies headache, nausea, vomiting, numbness, tingling.     She initially presented 4 months ago with difficulty typing and was diagnosed with GBM after resection. She received post-op RT and 1 cycle of Temodar.

## 2017-11-15 NOTE — CONSULT NOTE ADULT - PROBLEM SELECTOR RECOMMENDATION 2
c/w LABA/ICS combo, albuterol PRN. Mild intermittent, has not needed to use her rescue inhaler for years.  c/w LABA/ICS combo, albuterol PRN.

## 2017-11-15 NOTE — CONSULT NOTE ADULT - PROBLEM SELECTOR RECOMMENDATION 3
chronic palpitations, extra beats for years. Currently NSR on EKG. Monitor daily BMP, maintain K > 4, Mg > 2.

## 2017-11-15 NOTE — CONSULT NOTE ADULT - PROBLEM SELECTOR RECOMMENDATION 9
RCRI = 0, METS > 4.  Patient is low risk for procedure. AEDs, steroids, resection per NSGY. RCRI = 0, METS > 4.  Patient is low risk for procedure, no further testing neceessary, cleared for surgery from medical standpoint. --RCRI = 0, METS > 4.  Patient is low risk for procedure, no further testing neceessary, cleared for surgery from medical standpoint.  --continue zofran PRN for nausea --RCRI = 0, METS > 4.  Patient is low risk for procedure, no further testing neceessary, cleared for surgery from medical standpoint.  --continue zofran PRN for nausea  --continue keppra

## 2017-11-15 NOTE — H&P ADULT - ASSESSMENT
62F with history of R occipital GBM s/p R crani on 7/27 s/p RT and temodar was seen in the office today for new left sided weakness since 7 days and found to have recurrent tumor of R parieto-occipital lobe.    -Admitted to floor  -Neurochcks q4h  -Pre-op labs  -MRI synaptive w/ fiducials  -MR spect  -Pain control  -Decadron 10mg once followed by 4q6  -Keppra 500 BID  -Medical clearance  -Plan for OR on Friday  -Dsicussed with patient and

## 2017-11-16 LAB
ALBUMIN SERPL ELPH-MCNC: 4.2 G/DL
ALP BLD-CCNC: 68 U/L
ALT SERPL-CCNC: 35 U/L
ANION GAP SERPL CALC-SCNC: 15 MMOL/L
AST SERPL-CCNC: 27 U/L
BILIRUB SERPL-MCNC: 0.3 MG/DL
BUN SERPL-MCNC: 15 MG/DL
CALCIUM SERPL-MCNC: 9.5 MG/DL
CHLORIDE SERPL-SCNC: 101 MMOL/L
CO2 SERPL-SCNC: 25 MMOL/L
CREAT SERPL-MCNC: 0.69 MG/DL
GLUCOSE SERPL-MCNC: 136 MG/DL
POTASSIUM SERPL-SCNC: 4 MMOL/L
PROT SERPL-MCNC: 6.8 G/DL
SODIUM SERPL-SCNC: 141 MMOL/L

## 2017-11-16 PROCEDURE — 99232 SBSQ HOSP IP/OBS MODERATE 35: CPT

## 2017-11-16 PROCEDURE — 70552 MRI BRAIN STEM W/DYE: CPT | Mod: 26

## 2017-11-16 PROCEDURE — 76390 MR SPECTROSCOPY: CPT | Mod: 26

## 2017-11-16 RX ORDER — SODIUM CHLORIDE 9 MG/ML
1000 INJECTION, SOLUTION INTRAVENOUS
Qty: 0 | Refills: 0 | Status: DISCONTINUED | OUTPATIENT
Start: 2017-11-16 | End: 2017-11-17

## 2017-11-16 RX ORDER — FLUTICASONE PROPIONATE AND SALMETEROL 50; 250 UG/1; UG/1
1 POWDER ORAL; RESPIRATORY (INHALATION)
Qty: 0 | Refills: 0 | Status: DISCONTINUED | OUTPATIENT
Start: 2017-11-16 | End: 2017-11-17

## 2017-11-16 RX ADMIN — Medication 650 MILLIGRAM(S): at 02:52

## 2017-11-16 RX ADMIN — Medication 100 MILLIGRAM(S): at 21:17

## 2017-11-16 RX ADMIN — LEVETIRACETAM 500 MILLIGRAM(S): 250 TABLET, FILM COATED ORAL at 17:24

## 2017-11-16 RX ADMIN — Medication 4 MILLIGRAM(S): at 08:50

## 2017-11-16 RX ADMIN — Medication 4 MILLIGRAM(S): at 02:12

## 2017-11-16 RX ADMIN — Medication 1 TABLET(S): at 14:22

## 2017-11-16 RX ADMIN — Medication 100 MILLIGRAM(S): at 14:23

## 2017-11-16 RX ADMIN — FLUTICASONE PROPIONATE AND SALMETEROL 1 DOSE(S): 50; 250 POWDER ORAL; RESPIRATORY (INHALATION) at 17:49

## 2017-11-16 RX ADMIN — Medication 650 MILLIGRAM(S): at 17:24

## 2017-11-16 RX ADMIN — Medication 650 MILLIGRAM(S): at 17:54

## 2017-11-16 RX ADMIN — LEVETIRACETAM 500 MILLIGRAM(S): 250 TABLET, FILM COATED ORAL at 05:35

## 2017-11-16 RX ADMIN — Medication 650 MILLIGRAM(S): at 02:22

## 2017-11-16 RX ADMIN — Medication 4 MILLIGRAM(S): at 20:09

## 2017-11-16 RX ADMIN — Medication 10 MILLIGRAM(S): at 14:23

## 2017-11-16 RX ADMIN — Medication 4 MILLIGRAM(S): at 14:23

## 2017-11-16 NOTE — PROGRESS NOTE ADULT - ATTENDING COMMENTS
Agree with PA note. Patient personally seen and examined. All current imaging studies reviewed.  Pt improved on IV steroids...for R occipital craniotomy in am. MRI pending.  d/w pt  DC

## 2017-11-16 NOTE — PROGRESS NOTE ADULT - ASSESSMENT
HPI:  Patient is a 62 year old female with recent craniotomy for resection of brain mass.  Followed up in office the other day and had new left sided weakness.  Imaging done which showed recurrence.  Now admitted for surgery.    PROCEDURE: s/p right parietal craniotomy for resection of brain tumor on 7/27/2017  POD#112    PLAN:  -decadron 4q6  -MRI brain pending  -regular diet, npo/ivf at midnight  -hold lovenox/heparin for OR in AM, SCDs only for DVT prophylaxis  -keppra for seizure prophylaxis  -senna and colace for bowel regimen  -out of bed with assistance  -incentive spirometer for lung expansion  -am labs  -OR in am for craniotomy    Spectra #[id number]

## 2017-11-17 ENCOUNTER — RESULT REVIEW (OUTPATIENT)
Age: 62
End: 2017-11-17

## 2017-11-17 ENCOUNTER — APPOINTMENT (OUTPATIENT)
Dept: NEUROSURGERY | Facility: CLINIC | Age: 62
End: 2017-11-17

## 2017-11-17 ENCOUNTER — TRANSCRIPTION ENCOUNTER (OUTPATIENT)
Age: 62
End: 2017-11-17

## 2017-11-17 LAB
ANION GAP SERPL CALC-SCNC: 16 MMOL/L — SIGNIFICANT CHANGE UP (ref 5–17)
ANION GAP SERPL CALC-SCNC: 17 MMOL/L — SIGNIFICANT CHANGE UP (ref 5–17)
APTT BLD: 24.1 SEC — LOW (ref 27.5–37.4)
BASOPHILS # BLD AUTO: 0 K/UL — SIGNIFICANT CHANGE UP (ref 0–0.2)
BASOPHILS # BLD AUTO: 0 K/UL — SIGNIFICANT CHANGE UP (ref 0–0.2)
BASOPHILS NFR BLD AUTO: 0 % — SIGNIFICANT CHANGE UP (ref 0–2)
BASOPHILS NFR BLD AUTO: 0.1 % — SIGNIFICANT CHANGE UP (ref 0–2)
BLD GP AB SCN SERPL QL: NEGATIVE — SIGNIFICANT CHANGE UP
BUN SERPL-MCNC: 12 MG/DL — SIGNIFICANT CHANGE UP (ref 7–23)
BUN SERPL-MCNC: 14 MG/DL — SIGNIFICANT CHANGE UP (ref 7–23)
CALCIUM SERPL-MCNC: 8.5 MG/DL — SIGNIFICANT CHANGE UP (ref 8.4–10.5)
CALCIUM SERPL-MCNC: 9.7 MG/DL — SIGNIFICANT CHANGE UP (ref 8.4–10.5)
CHLORIDE SERPL-SCNC: 101 MMOL/L — SIGNIFICANT CHANGE UP (ref 96–108)
CHLORIDE SERPL-SCNC: 102 MMOL/L — SIGNIFICANT CHANGE UP (ref 96–108)
CO2 SERPL-SCNC: 23 MMOL/L — SIGNIFICANT CHANGE UP (ref 22–31)
CO2 SERPL-SCNC: 23 MMOL/L — SIGNIFICANT CHANGE UP (ref 22–31)
CREAT SERPL-MCNC: 0.58 MG/DL — SIGNIFICANT CHANGE UP (ref 0.5–1.3)
CREAT SERPL-MCNC: 0.58 MG/DL — SIGNIFICANT CHANGE UP (ref 0.5–1.3)
EOSINOPHIL # BLD AUTO: 0 K/UL — SIGNIFICANT CHANGE UP (ref 0–0.5)
EOSINOPHIL # BLD AUTO: 0 K/UL — SIGNIFICANT CHANGE UP (ref 0–0.5)
EOSINOPHIL NFR BLD AUTO: 0 % — SIGNIFICANT CHANGE UP (ref 0–6)
EOSINOPHIL NFR BLD AUTO: 0 % — SIGNIFICANT CHANGE UP (ref 0–6)
GLUCOSE BLDC GLUCOMTR-MCNC: 120 MG/DL — HIGH (ref 70–99)
GLUCOSE BLDC GLUCOMTR-MCNC: 137 MG/DL — HIGH (ref 70–99)
GLUCOSE SERPL-MCNC: 134 MG/DL — HIGH (ref 70–99)
GLUCOSE SERPL-MCNC: 138 MG/DL — HIGH (ref 70–99)
HCT VFR BLD CALC: 34.2 % — LOW (ref 34.5–45)
HCT VFR BLD CALC: 35.9 % — SIGNIFICANT CHANGE UP (ref 34.5–45)
HCT VFR BLD CALC: 42.7 % — SIGNIFICANT CHANGE UP (ref 34.5–45)
HGB BLD-MCNC: 12 G/DL — SIGNIFICANT CHANGE UP (ref 11.5–15.5)
HGB BLD-MCNC: 12.4 G/DL — SIGNIFICANT CHANGE UP (ref 11.5–15.5)
HGB BLD-MCNC: 13.9 G/DL — SIGNIFICANT CHANGE UP (ref 11.5–15.5)
INR BLD: 0.94 RATIO — SIGNIFICANT CHANGE UP (ref 0.88–1.16)
LYMPHOCYTES # BLD AUTO: 0.4 K/UL — LOW (ref 1–3.3)
LYMPHOCYTES # BLD AUTO: 0.5 K/UL — LOW (ref 1–3.3)
LYMPHOCYTES # BLD AUTO: 3.8 % — LOW (ref 13–44)
LYMPHOCYTES # BLD AUTO: 4.1 % — LOW (ref 13–44)
MAGNESIUM SERPL-MCNC: 2.3 MG/DL — SIGNIFICANT CHANGE UP (ref 1.6–2.6)
MCHC RBC-ENTMCNC: 32.6 GM/DL — SIGNIFICANT CHANGE UP (ref 32–36)
MCHC RBC-ENTMCNC: 33.8 PG — SIGNIFICANT CHANGE UP (ref 27–34)
MCHC RBC-ENTMCNC: 34.4 GM/DL — SIGNIFICANT CHANGE UP (ref 32–36)
MCHC RBC-ENTMCNC: 35.1 GM/DL — SIGNIFICANT CHANGE UP (ref 32–36)
MCHC RBC-ENTMCNC: 36.1 PG — HIGH (ref 27–34)
MCHC RBC-ENTMCNC: 36.9 PG — HIGH (ref 27–34)
MCV RBC AUTO: 104 FL — HIGH (ref 80–100)
MCV RBC AUTO: 105 FL — HIGH (ref 80–100)
MCV RBC AUTO: 105 FL — HIGH (ref 80–100)
MONOCYTES # BLD AUTO: 0.5 K/UL — SIGNIFICANT CHANGE UP (ref 0–0.9)
MONOCYTES # BLD AUTO: 0.7 K/UL — SIGNIFICANT CHANGE UP (ref 0–0.9)
MONOCYTES NFR BLD AUTO: 5.1 % — SIGNIFICANT CHANGE UP (ref 2–14)
MONOCYTES NFR BLD AUTO: 5.1 % — SIGNIFICANT CHANGE UP (ref 2–14)
NEUTROPHILS # BLD AUTO: 11.9 K/UL — HIGH (ref 1.8–7.4)
NEUTROPHILS # BLD AUTO: 9.5 K/UL — HIGH (ref 1.8–7.4)
NEUTROPHILS NFR BLD AUTO: 90.7 % — HIGH (ref 43–77)
NEUTROPHILS NFR BLD AUTO: 90.9 % — HIGH (ref 43–77)
PHOSPHATE SERPL-MCNC: 4 MG/DL — SIGNIFICANT CHANGE UP (ref 2.5–4.5)
PLATELET # BLD AUTO: 213 K/UL — SIGNIFICANT CHANGE UP (ref 150–400)
PLATELET # BLD AUTO: 219 K/UL — SIGNIFICANT CHANGE UP (ref 150–400)
PLATELET # BLD AUTO: 269 K/UL — SIGNIFICANT CHANGE UP (ref 150–400)
POTASSIUM SERPL-MCNC: 4.3 MMOL/L — SIGNIFICANT CHANGE UP (ref 3.5–5.3)
POTASSIUM SERPL-MCNC: 4.3 MMOL/L — SIGNIFICANT CHANGE UP (ref 3.5–5.3)
POTASSIUM SERPL-SCNC: 4.3 MMOL/L — SIGNIFICANT CHANGE UP (ref 3.5–5.3)
POTASSIUM SERPL-SCNC: 4.3 MMOL/L — SIGNIFICANT CHANGE UP (ref 3.5–5.3)
PROTHROM AB SERPL-ACNC: 10.2 SEC — SIGNIFICANT CHANGE UP (ref 9.8–12.7)
RBC # BLD: 3.25 M/UL — LOW (ref 3.8–5.2)
RBC # BLD: 3.42 M/UL — LOW (ref 3.8–5.2)
RBC # BLD: 4.11 M/UL — SIGNIFICANT CHANGE UP (ref 3.8–5.2)
RBC # FLD: 13.1 % — SIGNIFICANT CHANGE UP (ref 10.3–14.5)
RBC # FLD: 13.3 % — SIGNIFICANT CHANGE UP (ref 10.3–14.5)
RBC # FLD: 13.4 % — SIGNIFICANT CHANGE UP (ref 10.3–14.5)
RH IG SCN BLD-IMP: POSITIVE — SIGNIFICANT CHANGE UP
SODIUM SERPL-SCNC: 141 MMOL/L — SIGNIFICANT CHANGE UP (ref 135–145)
SODIUM SERPL-SCNC: 141 MMOL/L — SIGNIFICANT CHANGE UP (ref 135–145)
WBC # BLD: 10.5 K/UL — SIGNIFICANT CHANGE UP (ref 3.8–10.5)
WBC # BLD: 13.1 K/UL — HIGH (ref 3.8–10.5)
WBC # BLD: 9.1 K/UL — SIGNIFICANT CHANGE UP (ref 3.8–10.5)
WBC # FLD AUTO: 10.5 K/UL — SIGNIFICANT CHANGE UP (ref 3.8–10.5)
WBC # FLD AUTO: 13.1 K/UL — HIGH (ref 3.8–10.5)
WBC # FLD AUTO: 9.1 K/UL — SIGNIFICANT CHANGE UP (ref 3.8–10.5)

## 2017-11-17 PROCEDURE — 88341 IMHCHEM/IMCYTCHM EA ADD ANTB: CPT | Mod: 26,59

## 2017-11-17 PROCEDURE — 14302 TIS TRNFR ADDL 30 SQ CM: CPT | Mod: 80

## 2017-11-17 PROCEDURE — 88342 IMHCHEM/IMCYTCHM 1ST ANTB: CPT | Mod: 26,59

## 2017-11-17 PROCEDURE — 61510 CRNEC TREPH EXC BRN TUM STTL: CPT

## 2017-11-17 PROCEDURE — 88360 TUMOR IMMUNOHISTOCHEM/MANUAL: CPT | Mod: 26

## 2017-11-17 PROCEDURE — 99232 SBSQ HOSP IP/OBS MODERATE 35: CPT | Mod: 57

## 2017-11-17 PROCEDURE — 99292 CRITICAL CARE ADDL 30 MIN: CPT

## 2017-11-17 PROCEDURE — 14301 TIS TRNFR ANY 30.1-60 SQ CM: CPT | Mod: 80

## 2017-11-17 PROCEDURE — 88307 TISSUE EXAM BY PATHOLOGIST: CPT | Mod: 26

## 2017-11-17 PROCEDURE — 99291 CRITICAL CARE FIRST HOUR: CPT

## 2017-11-17 PROCEDURE — 61781 SCAN PROC CRANIAL INTRA: CPT

## 2017-11-17 RX ORDER — INSULIN LISPRO 100/ML
VIAL (ML) SUBCUTANEOUS
Qty: 0 | Refills: 0 | Status: DISCONTINUED | OUTPATIENT
Start: 2017-11-17 | End: 2017-11-20

## 2017-11-17 RX ORDER — LEVETIRACETAM 250 MG/1
500 TABLET, FILM COATED ORAL EVERY 12 HOURS
Qty: 0 | Refills: 0 | Status: DISCONTINUED | OUTPATIENT
Start: 2017-11-17 | End: 2017-11-20

## 2017-11-17 RX ORDER — ONDANSETRON 8 MG/1
4 TABLET, FILM COATED ORAL EVERY 6 HOURS
Qty: 0 | Refills: 0 | Status: DISCONTINUED | OUTPATIENT
Start: 2017-11-17 | End: 2017-11-20

## 2017-11-17 RX ORDER — OXYCODONE HYDROCHLORIDE 5 MG/1
10 TABLET ORAL EVERY 6 HOURS
Qty: 0 | Refills: 0 | Status: DISCONTINUED | OUTPATIENT
Start: 2017-11-17 | End: 2017-11-20

## 2017-11-17 RX ORDER — INSULIN LISPRO 100/ML
VIAL (ML) SUBCUTANEOUS EVERY 6 HOURS
Qty: 0 | Refills: 0 | Status: DISCONTINUED | OUTPATIENT
Start: 2017-11-17 | End: 2017-11-17

## 2017-11-17 RX ORDER — ACETAMINOPHEN 500 MG
1000 TABLET ORAL ONCE
Qty: 0 | Refills: 0 | Status: COMPLETED | OUTPATIENT
Start: 2017-11-17 | End: 2017-11-17

## 2017-11-17 RX ORDER — ACETAMINOPHEN 500 MG
650 TABLET ORAL EVERY 6 HOURS
Qty: 0 | Refills: 0 | Status: DISCONTINUED | OUTPATIENT
Start: 2017-11-17 | End: 2017-11-20

## 2017-11-17 RX ORDER — MORPHINE SULFATE 50 MG/1
1 CAPSULE, EXTENDED RELEASE ORAL ONCE
Qty: 0 | Refills: 0 | Status: DISCONTINUED | OUTPATIENT
Start: 2017-11-17 | End: 2017-11-17

## 2017-11-17 RX ORDER — DOCUSATE SODIUM 100 MG
100 CAPSULE ORAL THREE TIMES A DAY
Qty: 0 | Refills: 0 | Status: DISCONTINUED | OUTPATIENT
Start: 2017-11-17 | End: 2017-11-20

## 2017-11-17 RX ORDER — DEXAMETHASONE 0.5 MG/5ML
4 ELIXIR ORAL EVERY 6 HOURS
Qty: 0 | Refills: 0 | Status: DISCONTINUED | OUTPATIENT
Start: 2017-11-17 | End: 2017-11-19

## 2017-11-17 RX ORDER — ONDANSETRON 8 MG/1
4 TABLET, FILM COATED ORAL
Qty: 0 | Refills: 0 | Status: COMPLETED | OUTPATIENT
Start: 2017-11-17 | End: 2017-11-17

## 2017-11-17 RX ORDER — TRAMADOL HYDROCHLORIDE 50 MG/1
50 TABLET ORAL EVERY 4 HOURS
Qty: 0 | Refills: 0 | Status: DISCONTINUED | OUTPATIENT
Start: 2017-11-17 | End: 2017-11-17

## 2017-11-17 RX ORDER — OXYCODONE HYDROCHLORIDE 5 MG/1
5 TABLET ORAL EVERY 6 HOURS
Qty: 0 | Refills: 0 | Status: DISCONTINUED | OUTPATIENT
Start: 2017-11-17 | End: 2017-11-20

## 2017-11-17 RX ORDER — HYDROMORPHONE HYDROCHLORIDE 2 MG/ML
0.5 INJECTION INTRAMUSCULAR; INTRAVENOUS; SUBCUTANEOUS
Qty: 0 | Refills: 0 | Status: DISCONTINUED | OUTPATIENT
Start: 2017-11-17 | End: 2017-11-17

## 2017-11-17 RX ORDER — DEXTROSE MONOHYDRATE, SODIUM CHLORIDE, AND POTASSIUM CHLORIDE 50; .745; 4.5 G/1000ML; G/1000ML; G/1000ML
1000 INJECTION, SOLUTION INTRAVENOUS
Qty: 0 | Refills: 0 | Status: DISCONTINUED | OUTPATIENT
Start: 2017-11-17 | End: 2017-11-18

## 2017-11-17 RX ADMIN — TRAMADOL HYDROCHLORIDE 50 MILLIGRAM(S): 50 TABLET ORAL at 18:45

## 2017-11-17 RX ADMIN — Medication 400 MILLIGRAM(S): at 14:54

## 2017-11-17 RX ADMIN — FLUTICASONE PROPIONATE AND SALMETEROL 1 DOSE(S): 50; 250 POWDER ORAL; RESPIRATORY (INHALATION) at 05:38

## 2017-11-17 RX ADMIN — TRAMADOL HYDROCHLORIDE 50 MILLIGRAM(S): 50 TABLET ORAL at 18:15

## 2017-11-17 RX ADMIN — MORPHINE SULFATE 1 MILLIGRAM(S): 50 CAPSULE, EXTENDED RELEASE ORAL at 19:07

## 2017-11-17 RX ADMIN — MORPHINE SULFATE 1 MILLIGRAM(S): 50 CAPSULE, EXTENDED RELEASE ORAL at 19:15

## 2017-11-17 RX ADMIN — ONDANSETRON 4 MILLIGRAM(S): 8 TABLET, FILM COATED ORAL at 18:00

## 2017-11-17 RX ADMIN — HYDROMORPHONE HYDROCHLORIDE 0.5 MILLIGRAM(S): 2 INJECTION INTRAMUSCULAR; INTRAVENOUS; SUBCUTANEOUS at 23:45

## 2017-11-17 RX ADMIN — LEVETIRACETAM 500 MILLIGRAM(S): 250 TABLET, FILM COATED ORAL at 18:00

## 2017-11-17 RX ADMIN — Medication 10 MILLIGRAM(S): at 23:07

## 2017-11-17 RX ADMIN — Medication 1000 MILLIGRAM(S): at 14:56

## 2017-11-17 RX ADMIN — HYDROMORPHONE HYDROCHLORIDE 0.5 MILLIGRAM(S): 2 INJECTION INTRAMUSCULAR; INTRAVENOUS; SUBCUTANEOUS at 14:23

## 2017-11-17 RX ADMIN — Medication 100 MILLIGRAM(S): at 22:31

## 2017-11-17 RX ADMIN — HYDROMORPHONE HYDROCHLORIDE 0.5 MILLIGRAM(S): 2 INJECTION INTRAMUSCULAR; INTRAVENOUS; SUBCUTANEOUS at 18:15

## 2017-11-17 RX ADMIN — Medication 4 MILLIGRAM(S): at 23:58

## 2017-11-17 RX ADMIN — HYDROMORPHONE HYDROCHLORIDE 0.5 MILLIGRAM(S): 2 INJECTION INTRAMUSCULAR; INTRAVENOUS; SUBCUTANEOUS at 18:00

## 2017-11-17 RX ADMIN — HYDROMORPHONE HYDROCHLORIDE 0.5 MILLIGRAM(S): 2 INJECTION INTRAMUSCULAR; INTRAVENOUS; SUBCUTANEOUS at 20:51

## 2017-11-17 RX ADMIN — Medication 4 MILLIGRAM(S): at 01:26

## 2017-11-17 RX ADMIN — HYDROMORPHONE HYDROCHLORIDE 0.5 MILLIGRAM(S): 2 INJECTION INTRAMUSCULAR; INTRAVENOUS; SUBCUTANEOUS at 21:05

## 2017-11-17 RX ADMIN — LEVETIRACETAM 500 MILLIGRAM(S): 250 TABLET, FILM COATED ORAL at 05:40

## 2017-11-17 RX ADMIN — ONDANSETRON 4 MILLIGRAM(S): 8 TABLET, FILM COATED ORAL at 23:59

## 2017-11-17 RX ADMIN — Medication 100 MILLIGRAM(S): at 15:29

## 2017-11-17 RX ADMIN — Medication 4 MILLIGRAM(S): at 18:00

## 2017-11-17 RX ADMIN — HYDROMORPHONE HYDROCHLORIDE 0.5 MILLIGRAM(S): 2 INJECTION INTRAMUSCULAR; INTRAVENOUS; SUBCUTANEOUS at 14:45

## 2017-11-17 NOTE — PROGRESS NOTE ADULT - ASSESSMENT
A/P: post-operative day 0 from right craniotomy for brain tumor resection/GBM  - Dexamethasone for cerebral edema  - Seizure prophylaxis  - Monitor surgical site for bleeding  - CT Head in AM  - MRI post-op  - Home meds for asthma and depression  - DVT chemoppx post-operative day 1 if CT without heme as high risk of DVT/PE on admission given history of malignancy    45 additional critical care minutes

## 2017-11-17 NOTE — BRIEF OPERATIVE NOTE - PRE-OP DX
Difficult airway for intubation  11/17/2017  sucessful intubation with glide 3 scope  Active  Simón Caldera  Difficult airway for intubation  11/17/2017    Active  Simón Caldera  Difficult airway for intubation  11/17/2017    Active  Simón Caldera  Glioblastoma multiforme of parietal lobe  11/17/2017    Active  Earnest Goodman

## 2017-11-17 NOTE — BRIEF OPERATIVE NOTE - PROCEDURE
<<-----Click on this checkbox to enter Procedure Craniotomy and excision of neoplasm of brain  11/17/2017    Active  [id number]

## 2017-11-17 NOTE — PROGRESS NOTE ADULT - ASSESSMENT
Summary:     NEURO:  q1h neuro checks    CARDS:  -150    PULM:  sat > 92%    RENAL:  IVL    GASTRO:  advance as tolerated  ---> Stress ulcer prophylaxis:  PPI    HEME:  monitor H/H    ---> DVT prophylaxis: SCDs, hold anticoagulation, fresh    ENDO:  euglycemia    ID:  afebrile    Code status:  Full code  Disposition:  ICU    This patient was at high risk of neurologic deterioration and/or death due to:     Time spent:  45 minutes Summary: POD#0 R crani for tumor resection; hx GBM s/p resection    NEURO:  q1h neuro checks, pain control, CTH in am, MRI, decadron taper, keppra for seizure ppx    CARDS:  -150    PULM:  sat > 92%, IS    RENAL:  IVL once tolerating PO    GASTRO:  advance as tolerated  ---> Stress ulcer prophylaxis:  PPI no indicated    HEME:  monitor H/H    ---> DVT prophylaxis: SCDs, hold anticoagulation, fresh post op    ENDO:  euglycemia, OMAR    ID:  afebrile    Code status:  Full code  Disposition:  ICU    This patient was at high risk of neurologic deterioration and/or death due to: hemorrhage, hydrocephalus, cerebral edema, brain compression    Time spent:  45 minutes

## 2017-11-18 ENCOUNTER — TRANSCRIPTION ENCOUNTER (OUTPATIENT)
Age: 62
End: 2017-11-18

## 2017-11-18 LAB
GLUCOSE BLDC GLUCOMTR-MCNC: 118 MG/DL — HIGH (ref 70–99)
GLUCOSE BLDC GLUCOMTR-MCNC: 123 MG/DL — HIGH (ref 70–99)
GLUCOSE BLDC GLUCOMTR-MCNC: 139 MG/DL — HIGH (ref 70–99)

## 2017-11-18 PROCEDURE — 70450 CT HEAD/BRAIN W/O DYE: CPT | Mod: 26

## 2017-11-18 PROCEDURE — 99233 SBSQ HOSP IP/OBS HIGH 50: CPT

## 2017-11-18 PROCEDURE — 70553 MRI BRAIN STEM W/O & W/DYE: CPT | Mod: 26

## 2017-11-18 RX ORDER — METOCLOPRAMIDE HCL 10 MG
10 TABLET ORAL ONCE
Qty: 0 | Refills: 0 | Status: COMPLETED | OUTPATIENT
Start: 2017-11-18 | End: 2017-11-17

## 2017-11-18 RX ORDER — MORPHINE SULFATE 50 MG/1
1 CAPSULE, EXTENDED RELEASE ORAL ONCE
Qty: 0 | Refills: 0 | Status: DISCONTINUED | OUTPATIENT
Start: 2017-11-18 | End: 2017-11-18

## 2017-11-18 RX ORDER — ENOXAPARIN SODIUM 100 MG/ML
40 INJECTION SUBCUTANEOUS
Qty: 0 | Refills: 0 | Status: DISCONTINUED | OUTPATIENT
Start: 2017-11-18 | End: 2017-11-20

## 2017-11-18 RX ORDER — DIAZEPAM 5 MG
5 TABLET ORAL ONCE
Qty: 0 | Refills: 0 | Status: DISCONTINUED | OUTPATIENT
Start: 2017-11-18 | End: 2017-11-18

## 2017-11-18 RX ORDER — IPRATROPIUM/ALBUTEROL SULFATE 18-103MCG
3 AEROSOL WITH ADAPTER (GRAM) INHALATION ONCE
Qty: 0 | Refills: 0 | Status: DISCONTINUED | OUTPATIENT
Start: 2017-11-18 | End: 2017-11-18

## 2017-11-18 RX ORDER — HYDROMORPHONE HYDROCHLORIDE 2 MG/ML
0.5 INJECTION INTRAMUSCULAR; INTRAVENOUS; SUBCUTANEOUS ONCE
Qty: 0 | Refills: 0 | Status: DISCONTINUED | OUTPATIENT
Start: 2017-11-18 | End: 2017-11-18

## 2017-11-18 RX ADMIN — MORPHINE SULFATE 1 MILLIGRAM(S): 50 CAPSULE, EXTENDED RELEASE ORAL at 05:45

## 2017-11-18 RX ADMIN — OXYCODONE HYDROCHLORIDE 10 MILLIGRAM(S): 5 TABLET ORAL at 21:00

## 2017-11-18 RX ADMIN — HYDROMORPHONE HYDROCHLORIDE 0.5 MILLIGRAM(S): 2 INJECTION INTRAMUSCULAR; INTRAVENOUS; SUBCUTANEOUS at 00:00

## 2017-11-18 RX ADMIN — Medication 100 MILLIGRAM(S): at 05:07

## 2017-11-18 RX ADMIN — LEVETIRACETAM 500 MILLIGRAM(S): 250 TABLET, FILM COATED ORAL at 05:07

## 2017-11-18 RX ADMIN — Medication 5 MILLIGRAM(S): at 18:47

## 2017-11-18 RX ADMIN — LEVETIRACETAM 500 MILLIGRAM(S): 250 TABLET, FILM COATED ORAL at 17:19

## 2017-11-18 RX ADMIN — Medication 4 MILLIGRAM(S): at 13:17

## 2017-11-18 RX ADMIN — DEXTROSE MONOHYDRATE, SODIUM CHLORIDE, AND POTASSIUM CHLORIDE 75 MILLILITER(S): 50; .745; 4.5 INJECTION, SOLUTION INTRAVENOUS at 05:08

## 2017-11-18 RX ADMIN — HYDROMORPHONE HYDROCHLORIDE 0.5 MILLIGRAM(S): 2 INJECTION INTRAMUSCULAR; INTRAVENOUS; SUBCUTANEOUS at 02:35

## 2017-11-18 RX ADMIN — OXYCODONE HYDROCHLORIDE 10 MILLIGRAM(S): 5 TABLET ORAL at 20:24

## 2017-11-18 RX ADMIN — Medication 100 MILLIGRAM(S): at 13:17

## 2017-11-18 RX ADMIN — Medication 4 MILLIGRAM(S): at 05:11

## 2017-11-18 RX ADMIN — Medication 100 MILLIGRAM(S): at 23:19

## 2017-11-18 RX ADMIN — MORPHINE SULFATE 1 MILLIGRAM(S): 50 CAPSULE, EXTENDED RELEASE ORAL at 06:00

## 2017-11-18 RX ADMIN — Medication 4 MILLIGRAM(S): at 23:19

## 2017-11-18 RX ADMIN — HYDROMORPHONE HYDROCHLORIDE 0.5 MILLIGRAM(S): 2 INJECTION INTRAMUSCULAR; INTRAVENOUS; SUBCUTANEOUS at 02:50

## 2017-11-18 RX ADMIN — Medication 4 MILLIGRAM(S): at 17:19

## 2017-11-18 RX ADMIN — ENOXAPARIN SODIUM 40 MILLIGRAM(S): 100 INJECTION SUBCUTANEOUS at 17:19

## 2017-11-18 NOTE — OCCUPATIONAL THERAPY INITIAL EVALUATION ADULT - PERTINENT HX OF CURRENT PROBLEM, REHAB EVAL
61 y/o F of asthma, R occipital GBM s/p R crani on 7/27 s/p RT and temodar was seen in office for new L sided weakness since 7 days, +unsteady gait. MRI performed showed recurrent R occipital lesion. 11/17- Craniotomy and excision of neoplasm of brain. CT head: redemonstration of extensive vasogenic edema involving the R parietal lobe.

## 2017-11-18 NOTE — PROGRESS NOTE ADULT - ASSESSMENT
62F s/p craniotomy, tumor re-excision, PRS scalp closure.  >> Now has ballotable collection at surgical site. Discussed with neurosurgery possibility of CSF vs. seroma. Will follow.   >> HOB elevation.  >> Care per primary team.  >> Discussed with Dr. Jalloh.

## 2017-11-18 NOTE — PROGRESS NOTE ADULT - ASSESSMENT
62F POD1 s/p R crani for resection of recurrent GBM.   -CTH in AM  - MRI in 48 hrs  - Plastics following wound   - q. 1 hour neuro checks

## 2017-11-18 NOTE — OCCUPATIONAL THERAPY INITIAL EVALUATION ADULT - RANGE OF MOTION EXAMINATION, UPPER EXTREMITY
bilateral UE Active ROM was WFL  (within functional limits)/additional time required for L hand however full range of motion is present

## 2017-11-18 NOTE — PROGRESS NOTE ADULT - ASSESSMENT
Summary: POD#1 R crani for tumor resection; hx GBM s/p resection    NEURO:  q1h neuro checks, pain control, CTH in this am, MRI, decadron taper, keppra for seizure ppx    CARDS:  -150    PULM:  sat > 92%, IS    RENAL:  IVL   GASTRO:  advance as tolerated  ---> Stress ulcer prophylaxis:  PPI no indicated    HEME:  monitor H/H    ---> DVT prophylaxis: SCDs, hold anticoagulation, fresh post op    ENDO:  euglycemia, OMAR    ID:  afebrile    Code status:  Full code  Disposition:  Floors    Noncritical care time spent:  35 minutes Summary: POD#1 R crani for tumor resection; hx GBM s/p resection    NEURO:  q1h neuro checks, pain control, CTH in this am, MRI within 48hrs, decadron taper, keppra for seizure ppx    CARDS:  -150    PULM:  sat > 92%, IS    RENAL:  IVL   GASTRO:  advance as tolerated  ---> Stress ulcer prophylaxis:  PPI no indicated    HEME:  monitor H/H    ---> DVT prophylaxis: SCDs, CTH stable, start chemoppx, Pt is high risk for VTE 2/2 GBM, screening dopplers ordered, currently asymptomatic    ENDO:  euglycemia, OMAR    ID:  afebrile    Code status:  Full code  Disposition:  Floors    Noncritical care time spent:  35 minutes

## 2017-11-19 LAB
ANION GAP SERPL CALC-SCNC: 17 MMOL/L — SIGNIFICANT CHANGE UP (ref 5–17)
BUN SERPL-MCNC: 13 MG/DL — SIGNIFICANT CHANGE UP (ref 7–23)
CALCIUM SERPL-MCNC: 8.8 MG/DL — SIGNIFICANT CHANGE UP (ref 8.4–10.5)
CHLORIDE SERPL-SCNC: 98 MMOL/L — SIGNIFICANT CHANGE UP (ref 96–108)
CO2 SERPL-SCNC: 24 MMOL/L — SIGNIFICANT CHANGE UP (ref 22–31)
CREAT SERPL-MCNC: 0.58 MG/DL — SIGNIFICANT CHANGE UP (ref 0.5–1.3)
GLUCOSE BLDC GLUCOMTR-MCNC: 113 MG/DL — HIGH (ref 70–99)
GLUCOSE BLDC GLUCOMTR-MCNC: 133 MG/DL — HIGH (ref 70–99)
GLUCOSE BLDC GLUCOMTR-MCNC: 134 MG/DL — HIGH (ref 70–99)
GLUCOSE BLDC GLUCOMTR-MCNC: 166 MG/DL — HIGH (ref 70–99)
GLUCOSE SERPL-MCNC: 151 MG/DL — HIGH (ref 70–99)
HCT VFR BLD CALC: 39.5 % — SIGNIFICANT CHANGE UP (ref 34.5–45)
HGB BLD-MCNC: 13.5 G/DL — SIGNIFICANT CHANGE UP (ref 11.5–15.5)
MCHC RBC-ENTMCNC: 34.1 GM/DL — SIGNIFICANT CHANGE UP (ref 32–36)
MCHC RBC-ENTMCNC: 35.9 PG — HIGH (ref 27–34)
MCV RBC AUTO: 105 FL — HIGH (ref 80–100)
PLATELET # BLD AUTO: 229 K/UL — SIGNIFICANT CHANGE UP (ref 150–400)
POTASSIUM SERPL-MCNC: 3.9 MMOL/L — SIGNIFICANT CHANGE UP (ref 3.5–5.3)
POTASSIUM SERPL-SCNC: 3.9 MMOL/L — SIGNIFICANT CHANGE UP (ref 3.5–5.3)
RBC # BLD: 3.75 M/UL — LOW (ref 3.8–5.2)
RBC # FLD: 13.1 % — SIGNIFICANT CHANGE UP (ref 10.3–14.5)
SODIUM SERPL-SCNC: 139 MMOL/L — SIGNIFICANT CHANGE UP (ref 135–145)
WBC # BLD: 7.8 K/UL — SIGNIFICANT CHANGE UP (ref 3.8–10.5)
WBC # FLD AUTO: 7.8 K/UL — SIGNIFICANT CHANGE UP (ref 3.8–10.5)

## 2017-11-19 RX ORDER — DEXAMETHASONE 0.5 MG/5ML
4 ELIXIR ORAL EVERY 6 HOURS
Qty: 0 | Refills: 0 | Status: DISCONTINUED | OUTPATIENT
Start: 2017-11-19 | End: 2017-11-20

## 2017-11-19 RX ADMIN — Medication 4 MILLIGRAM(S): at 17:55

## 2017-11-19 RX ADMIN — ENOXAPARIN SODIUM 40 MILLIGRAM(S): 100 INJECTION SUBCUTANEOUS at 17:55

## 2017-11-19 RX ADMIN — Medication 100 MILLIGRAM(S): at 05:04

## 2017-11-19 RX ADMIN — LEVETIRACETAM 500 MILLIGRAM(S): 250 TABLET, FILM COATED ORAL at 05:04

## 2017-11-19 RX ADMIN — Medication 1: at 22:27

## 2017-11-19 RX ADMIN — Medication 4 MILLIGRAM(S): at 05:04

## 2017-11-19 RX ADMIN — Medication 100 MILLIGRAM(S): at 21:30

## 2017-11-19 RX ADMIN — Medication 4 MILLIGRAM(S): at 12:53

## 2017-11-19 RX ADMIN — LEVETIRACETAM 500 MILLIGRAM(S): 250 TABLET, FILM COATED ORAL at 17:55

## 2017-11-19 RX ADMIN — Medication 100 MILLIGRAM(S): at 13:02

## 2017-11-19 RX ADMIN — OXYCODONE HYDROCHLORIDE 10 MILLIGRAM(S): 5 TABLET ORAL at 17:54

## 2017-11-19 NOTE — PROGRESS NOTE ADULT - ASSESSMENT
HPI:   62F with history of asthma, R occipital GBM s/p R crani on  s/p RT and temodar was seen in the office today for new left sided weakness since 7 days. She has associated unsteady gait. She obtained an MRI  which showed recurrent R occipital lesion. She was admitted to hospital for worsening symptoms and planned surgery for Friday. Patient denies headache, nausea, vomiting, numbness, tingling.  She initially presented 4 months ago with difficulty typing and was diagnosed with GBM after resection. She received post-op RT and 1 cycle of Temodar.   s/p R craniotomy for tumor resection      PROCEDURE: Craniotomy and excision of neoplasm of brain     POD#2  PAST MEDICAL & SURGICAL HISTORY:  Asthma  S/P       PLAN:  Neuro: neuro checks q4, vitals q4, pain control, out of bed walking with walker, continue decadron for cerebral edema, has her own neuro and rad onc from prior admission, follow up mri result, keppra of seizure prophylaxis   Respiratory:  encourage incentive spirometry  CV: keep sbp 100-140  Endocrine:  euglycemic   Heme/Onc:    stable          DVT ppx: lovenox   Renal: ivl   ID: afebrile   GI:  regular, colace   PT/OT: pt pending ot outpatient pt   Will discuss with Dr. Vishal Francis #

## 2017-11-20 ENCOUNTER — TRANSCRIPTION ENCOUNTER (OUTPATIENT)
Age: 62
End: 2017-11-20

## 2017-11-20 VITALS
TEMPERATURE: 98 F | DIASTOLIC BLOOD PRESSURE: 85 MMHG | RESPIRATION RATE: 18 BRPM | OXYGEN SATURATION: 97 % | HEART RATE: 81 BPM | SYSTOLIC BLOOD PRESSURE: 157 MMHG

## 2017-11-20 LAB
GLUCOSE BLDC GLUCOMTR-MCNC: 104 MG/DL — HIGH (ref 70–99)
GLUCOSE BLDC GLUCOMTR-MCNC: 137 MG/DL — HIGH (ref 70–99)

## 2017-11-20 PROCEDURE — 93970 EXTREMITY STUDY: CPT | Mod: 26

## 2017-11-20 RX ORDER — DEXAMETHASONE 0.5 MG/5ML
1 ELIXIR ORAL
Qty: 30 | Refills: 0 | OUTPATIENT
Start: 2017-11-20

## 2017-11-20 RX ORDER — DEXAMETHASONE 0.5 MG/5ML
4 ELIXIR ORAL EVERY 8 HOURS
Qty: 0 | Refills: 0 | Status: DISCONTINUED | OUTPATIENT
Start: 2017-11-20 | End: 2017-11-20

## 2017-11-20 RX ORDER — OXYCODONE HYDROCHLORIDE 5 MG/1
1 TABLET ORAL
Qty: 20 | Refills: 0 | OUTPATIENT
Start: 2017-11-20

## 2017-11-20 RX ADMIN — Medication 100 MILLIGRAM(S): at 05:43

## 2017-11-20 RX ADMIN — OXYCODONE HYDROCHLORIDE 10 MILLIGRAM(S): 5 TABLET ORAL at 01:15

## 2017-11-20 RX ADMIN — Medication 4 MILLIGRAM(S): at 00:34

## 2017-11-20 RX ADMIN — Medication 4 MILLIGRAM(S): at 13:15

## 2017-11-20 RX ADMIN — LEVETIRACETAM 500 MILLIGRAM(S): 250 TABLET, FILM COATED ORAL at 05:44

## 2017-11-20 RX ADMIN — OXYCODONE HYDROCHLORIDE 10 MILLIGRAM(S): 5 TABLET ORAL at 12:27

## 2017-11-20 RX ADMIN — OXYCODONE HYDROCHLORIDE 10 MILLIGRAM(S): 5 TABLET ORAL at 00:35

## 2017-11-20 RX ADMIN — Medication 4 MILLIGRAM(S): at 05:44

## 2017-11-20 RX ADMIN — Medication 100 MILLIGRAM(S): at 13:15

## 2017-11-20 RX ADMIN — OXYCODONE HYDROCHLORIDE 10 MILLIGRAM(S): 5 TABLET ORAL at 13:16

## 2017-11-20 NOTE — DISCHARGE NOTE ADULT - NS AS ACTIVITY OBS
Showering allowed/No Heavy lifting/straining/Do not drive or operate machinery/Walking-Indoors allowed/Walking-Outdoors allowed/Stairs allowed

## 2017-11-20 NOTE — DISCHARGE NOTE ADULT - CARE PROVIDER_API CALL
Sam Jalloh), Plastic Surgery  [address]  [address]  [place] [address]  Phone: [phone]  Fax: [phone]    Fareed Fontanez (MD), Hematology; HospicePalliative Medicine; Internal Medicine; Medical Oncology  [address] [place] [address]  Phone: [phone]  Fax: [phone]    Benjy Melissa), Therapeutic Radiology  [address] [place] [address]  Phone: [phone]  Fax: [phone]    Yovani Rodgers), Neurological Surgery  [address] [place] [address]  Phone: [phone]  Fax: [phone]

## 2017-11-20 NOTE — DISCHARGE NOTE ADULT - HOSPITAL COURSE
62F with history of asthma, R occipital GBM s/p R crani on 7/27 s/p RT and temodar was seen in the office today for new left sided weakness since 7 days with associated unsteady gait. Outpatient  MRI 11/13 which showed recurrent Right occipital lesion , right parietal occipital craniotomy, microscopic dissection and gross total removal of right parieto-occipital recurrent GBM 11/17/17. Post op MRI with good results. Steroid taper for cerebral edema. Evaluated by PT and discharged home in stable condition with recommendations for outpatient PT.

## 2017-11-20 NOTE — DISCHARGE NOTE ADULT - CARE PROVIDERS DIRECT ADDRESSES
,DirectAddress_Unknown,[email or web address]

## 2017-11-20 NOTE — DISCHARGE NOTE ADULT - MEDICATION SUMMARY - MEDICATIONS TO TAKE
I will START or STAY ON the medications listed below when I get home from the hospital:    dexamethasone 4 mg oral tablet  -- 1 tab(s) by mouth every 8 hours until 11/22/ then   1 tab every 12 hrs until 11/25 then 1/2 tab every 12 hrs  until follow up with Dr Rodgers  -- Indication: For Decrease cerebral edema    oxyCODONE 5 mg oral tablet  -- 1 tab(s) by mouth every 6 hours, As needed, Moderate Pain (4 - 6) MDD:3  -- Indication: For headaches    acetaminophen 325 mg oral tablet  -- 2 tab(s) by mouth every 6 hours, As needed, Mild Pain (1 - 3)  -- Indication: For Mild headaches    levETIRAcetam 500 mg oral tablet  -- 1 tab(s) by mouth every 12 hours  -- Indication: For seizure prevention    PROzac  --  by mouth   -- Indication: For Depression    Advair Diskus  --  inhaled   -- Indication: For Uncomplicated asthma    senna oral tablet  -- 2 tab(s) by mouth once a day (at bedtime), As needed, Constipation  -- Indication: For constipation    Multiple Vitamins oral tablet  -- 1 tab(s) by mouth once a day  -- Indication: For supplement

## 2017-11-20 NOTE — PROGRESS NOTE ADULT - ASSESSMENT
62F with history of asthma, R occipital GBM s/p R crani on 7/27 s/p RT and temodar was seen in the office today for new left sided weakness since 7 days with associated unsteady gait. Outpatient  MRI 11/13 which showed recurrent Right occipital lesion , right parietal occipital craniotomy, microscopic dissection and gross total removal of right occipital recurrent GBM 11/17/17. Post op MRI with good results.      Plan:  Neuro stable. Slow decadron taper. On keppra  Vitals stable  PT- Outpatient  D/C home today

## 2017-11-20 NOTE — PHYSICAL THERAPY INITIAL EVALUATION ADULT - ADDITIONAL COMMENTS
lives with  in private house with 3 steps to enter with bilateral rails, 1 flight inside with 1 rail, right hand dominant    + tremor L hand which pt states is not new

## 2017-11-20 NOTE — PHYSICAL THERAPY INITIAL EVALUATION ADULT - PERTINENT HX OF CURRENT PROBLEM, REHAB EVAL
62F with PMHx: asthma, R occipital GBM s/p R crani on 7/27 s/p RT & temodar was seen in the office today for new left sided weakness x7 days, +associated unsteady gait. MRI 11/13: recurrent R occipital lesion. Pt admitted to hospital for worsening symptoms & planned surgery. s/p Craniotomy and excision of Right parietal GBM with large cystic cavity 11/17/2017

## 2017-11-20 NOTE — DISCHARGE NOTE ADULT - NS AS DC STROKE ED MATERIALS
Stroke Education Booklet/Prescribed Medications/Need for Followup After Discharge/Risk Factors for Stroke/Call 911 for Stroke/Stroke Warning Signs and Symptoms

## 2017-11-20 NOTE — PROGRESS NOTE ADULT - SUBJECTIVE AND OBJECTIVE BOX
61F with history of GBM now post-operative day 0 from right craniotomy for resection of brain tumor with plastics closure. Post-operatively, bleeding noted at surgical site. Plastic Surgery called to evaluate and recommended observation.    Review of Systems: Mild surgical site pain, no chest pain, no shortness of breath    Examination: Awake, alert, fully oriented, follows, LUE 4+/5 otherwise full strength
SUBJECTIVE:  Doing well.   No overnight events.     OBJECTIVE:     ** VITAL SIGNS / I&O's **    T(C): 36.7 (11-18-17 @ 03:00), Max: 37.1 (11-17-17 @ 13:15)  T(F): 98.1 (11-18-17 @ 03:00), Max: 98.8 (11-17-17 @ 13:15)  HR: 63 (11-18-17 @ 06:00) (56 - 71)  BP: --  RR: 13 (11-18-17 @ 06:00) (9 - 21)  SpO2: 96% (11-18-17 @ 06:00) (92% - 99%)      17 Nov 2017 07:01  -  18 Nov 2017 07:00  --------------------------------------------------------  IN:    Oral Fluid: 30 mL    sodium chloride 0.9% with potassium chloride 20 mEq/L: 1125 mL  Total IN: 1155 mL    OUT:    Indwelling Catheter - Urethral: 1360 mL  Total OUT: 1360 mL    Total NET: -205 mL          ** PHYSICAL EXAM **    -- CONSTITUTIONAL: NAD.   -- HEENT: Scalp incision intact. No drainage. Ballotable collection appreciated under incision.  -- CARDIOVASCULAR: Regular rate and rhythm. S1, S2.  -- RESPIRATORY: Bilateral breath sounds.     ** LABS **                 12.0   10.5   )----------(  219       ( 17 Nov 2017 22:53 )               34.2      141    |  102    |  12     ----------------------------<  138        ( 17 Nov 2017 22:53 )  4.3     |  23     |  0.58     Ca    8.5        ( 17 Nov 2017 22:53 )  Phos  4.0       ( 17 Nov 2017 22:53 )  Mg     2.3       ( 17 Nov 2017 22:53 )
Daily Progress Note    SUBJECTIVE: Pt seen this morning, no over night event, no acute complaint     OBJECTIVE:   Vital Signs Last 24 Hrs  T(C): 36.7 (20 Nov 2017 08:12), Max: 37 (19 Nov 2017 16:28)  T(F): 98 (20 Nov 2017 08:12), Max: 98.6 (19 Nov 2017 16:28)  HR: 62 (20 Nov 2017 08:12) (58 - 71)  BP: 134/87 (20 Nov 2017 08:12) (130/87 - 155/93)  BP(mean): --  RR: 20 (20 Nov 2017 08:12) (18 - 20)  SpO2: 97% (20 Nov 2017 08:12) (96% - 98%)    PHYSICAL EXAM:  Constitutional: resting in bed with no acute distress  Respiratory:  unlabored breathing  Scalp: incision clean, dry, intact; staples in place with mild edema    I/O  I&O's Summary    19 Nov 2017 07:01  -  20 Nov 2017 07:00  --------------------------------------------------------  IN: 660 mL / OUT: 0 mL / NET: 660 mL      I&O's Detail    19 Nov 2017 07:01  -  20 Nov 2017 07:00  --------------------------------------------------------  IN:    Oral Fluid: 660 mL  Total IN: 660 mL    OUT:  Total OUT: 0 mL    Total NET: 660 mL    LABS:                        13.5   7.8   )-----------( 229      ( 19 Nov 2017 06:14 )             39.5     11-19    139  |  98  |  13  ----------------------------<  151<H>  3.9   |  24  |  0.58    Ca    8.8      19 Nov 2017 06:14    RADIOLOGY & ADDITIONAL STUDIES: no new studies performed
HPI:  62F with history of asthma, R occipital GBM s/p R crani on 7/27 s/p RT and temodar was seen in the office today for new left sided weakness since 7 days. She has associated unsteady gait. She obtained an MRI 11/13 which showed recurrent R occipital lesion. She was admitted to hospital for worsening symptoms and planned surgery for Friday. Patient denies headache, nausea, vomiting, numbness, tingling.     She initially presented 4 months ago with difficulty typing and was diagnosed with GBM after resection. She received post-op RT and 1 cycle of Temodar. (15 Nov 2017 17:07)    s/p R craniotomy for tumor resection    On admission, the patient was:  GCS: 15    VITALS:  T(C): , Max: 37.1 (11-17-17 @ 13:15)  HR:  (62 - 86)  BP:  (127/86 - 154/82)  ABP:  (128/61 - 142/70)  RR:  (9 - 18)  SpO2:  (93% - 98%)  Wt(kg): --    11-16-17 @ 07:01  -  11-17-17 @ 07:00  --------------------------------------------------------  IN: 1680 mL / OUT: 0 mL / NET: 1680 mL    LABS:  Na: 141 (11-17 @ 06:03), 141 (11-15 @ 18:44)  K: 4.3 (11-17 @ 06:03), 3.8 (11-15 @ 18:44)  Cl: 101 (11-17 @ 06:03), 101 (11-15 @ 18:44)  CO2: 23 (11-17 @ 06:03), 26 (11-15 @ 18:44)  BUN: 14 (11-17 @ 06:03), 17 (11-15 @ 18:44)  Cr: 0.58 (11-17 @ 06:03), 0.70 (11-15 @ 18:44)  Glu: 134(11-17 @ 06:03), 122(11-15 @ 18:44)    Hgb: 13.9 (11-17 @ 06:03), 13.7 (11-15 @ 18:44), 13.6 (11-15 @ 12:29)  Hct: 42.7 (11-17 @ 06:03), 41.2 (11-15 @ 18:44), 38.9 (11-15 @ 12:29)  WBC: 9.1 (11-17 @ 06:03), 4.5 (11-15 @ 18:44), 5.5 (11-15 @ 12:29)  Plt: 269 (11-17 @ 06:03), 240 (11-15 @ 18:44), 234 (11-15 @ 12:29)  PT: 10.2 (11-17 @ 06:03)  INR: 0.94 (11-17 @ 06:03)  aPTT: 24.1 (11-17 @ 06:03)    IMAGING:   Recent imaging studies were reviewed.    MEDICATIONS:  acetaminophen   Tablet 650 milliGRAM(s) Oral every 6 hours PRN  acetaminophen   Tablet. 650 milliGRAM(s) Oral every 6 hours PRN  dexamethasone  Injectable 4 milliGRAM(s) IV Push every 6 hours  docusate sodium 100 milliGRAM(s) Oral three times a day  HYDROmorphone  Injectable 0.5 milliGRAM(s) IV Push every 10 minutes PRN  levETIRAcetam 500 milliGRAM(s) Oral every 12 hours  ondansetron Injectable 4 milliGRAM(s) IV Push every 30 minutes PRN  ondansetron Injectable 4 milliGRAM(s) IV Push every 6 hours PRN  sodium chloride 0.9% with potassium chloride 20 mEq/L 1000 milliLiter(s) IV Continuous <Continuous>    EXAMINATION:  General:  calm  HEENT:  MMM, EOMI, FS TML  Neuro:  awake, alert, oriented x 3, follows commands, moves all extremities 4+ LUE and 5/5 RUE; BLE 5/5  Cards:  RRR  Respiratory:  no respiratory distress  Adomen:  soft  Extremities:  no edema  Skin:  warm/dry
HPI:  62F with history of asthma, R occipital GBM s/p R crani on 7/27 s/p RT and temodar was seen in the office today for new left sided weakness since 7 days. She has associated unsteady gait. She obtained an MRI 11/13 which showed recurrent R occipital lesion. She was admitted to hospital for worsening symptoms and planned surgery for Friday. Patient denies headache, nausea, vomiting, numbness, tingling.     She initially presented 4 months ago with difficulty typing and was diagnosed with GBM after resection. She received post-op RT and 1 cycle of Temodar. (15 Nov 2017 17:07)  11/17 s/p R craniotomy for tumor resection    On admission, the patient was:  GCS: 15    ROS: surgical site pain better controlled, nausea; no vomiting, CP/SOB  VITALS:   T(C): 36.7 (11-18-17 @ 03:00), Max: 37.1 (11-17-17 @ 13:15)  HR: 63 (11-18-17 @ 06:00) (56 - 71)  BP: --  RR: 13 (11-18-17 @ 06:00) (9 - 21)  SpO2: 96% (11-18-17 @ 06:00) (92% - 99%)    11-17-17 @ 07:01  -  11-18-17 @ 07:00  --------------------------------------------------------  IN: 1230 mL / OUT: 1360 mL / NET: -130 mL                        12.0   10.5  )-----------( 219      ( 17 Nov 2017 22:53 )             34.2     141  |  102  |  12  ----------------------------<  138<H>  4.3   |  23  |  0.58    Ca    8.5      17 Nov 2017 22:53  Phos  4.0     11-17  Mg     2.3     11-17    PT/INR - ( 17 Nov 2017 06:03 )   PT: 10.2 sec;   INR: 0.94 ratio       PTT - ( 17 Nov 2017 06:03 )  PTT:24.1 sec    MEDICATIONS  (STANDING):  dexamethasone  Injectable 4 milliGRAM(s) IV Push every 6 hours  docusate sodium 100 milliGRAM(s) Oral three times a day  insulin lispro (HumaLOG) corrective regimen sliding scale   SubCutaneous Before meals and at bedtime  levETIRAcetam 500 milliGRAM(s) Oral every 12 hours  sodium chloride 0.9% with potassium chloride 20 mEq/L 1000 milliLiter(s) (75 mL/Hr) IV Continuous <Continuous>    [All pertinent recent Imaging/Reports reviewed]    EXAMINATION:  General:  calm  HEENT:  MMM, EOMI, FS TML  Neuro:  awake, alert, oriented x 3, follows commands, moves all extremities 4+ LUE and 5/5 RUE; BLE 5/5  Cards:  RRR  Respiratory:  no respiratory distress  Adomen:  soft  Extremities:  no edema  Skin:  warm/dry
HPI:  Patient is a 62 year old female with recent craniotomy for resection of brain mass.  Followed up in office the other day and had new left sided weakness.  Imaging done which showed recurrence.  Now admitted for surgery.    OVERNIGHT EVENTS:  No acute events overnight.  MRI brain pending.  Planned for surgery tomorrow am.  Tolerating diet.  Has been out of bed.  Cleared by hospitalist for OR.    Vital Signs Last 24 Hrs  T(C): 36.8 (16 Nov 2017 09:09), Max: 36.9 (15 Nov 2017 16:57)  T(F): 98.2 (16 Nov 2017 09:09), Max: 98.5 (15 Nov 2017 16:57)  HR: 83 (16 Nov 2017 09:09) (74 - 83)  BP: 144/83 (16 Nov 2017 09:09) (123/84 - 158/97)  BP(mean): --  RR: 16 (16 Nov 2017 09:09) (16 - 16)  SpO2: 94% (16 Nov 2017 09:09) (94% - 97%)        PHYSICAL EXAM:  Neurological: awake, alert, oriented x3, follows commands, speech clear and fluent, perrl, eomi, face symmetric, tongue midline, no drift b/l, moves all extremities x4 w/ 5/5 strength throughout, sensation present, intact, equal throughout    Cardiovascular: +s1, s2  Respiratory: clear to auscultation b/l  Gastrointestinal: soft, non-distended, non-tender  Genitourinary: +voiding  Incision/Wound: prior crani incision well healed c/d/i    TUBES/LINES:  [x] none    DIET:  [x] regular    LABS:                        13.7   4.5   )-----------( 240      ( 15 Nov 2017 18:44 )             41.2     11-15    141  |  101  |  17  ----------------------------<  122<H>  3.8   |  26  |  0.70    Ca    9.2      15 Nov 2017 18:44    TPro  7.2  /  Alb  4.7  /  TBili  0.2  /  DBili  x   /  AST  23  /  ALT  32  /  AlkPhos  68  11-15    PT/INR - ( 15 Nov 2017 18:44 )   PT: 10.8 sec;   INR: 1.00 ratio         PTT - ( 15 Nov 2017 18:44 )  PTT:26.6 sec      Allergies    fish (Anaphylaxis)  No Known Drug Allergies  shellfish (Rash)        MEDICATIONS:  Antibiotics:    Neuro:  acetaminophen   Tablet 650 milliGRAM(s) Oral every 6 hours PRN  acetaminophen   Tablet. 650 milliGRAM(s) Oral every 6 hours PRN  FLUoxetine 10 milliGRAM(s) Oral daily  levETIRAcetam 500 milliGRAM(s) Oral two times a day  ondansetron   Disintegrating Tablet 4 milliGRAM(s) Oral every 6 hours PRN  oxyCODONE    5 mG/acetaminophen 325 mG 1 Tablet(s) Oral every 4 hours PRN    Anticoagulation:    OTHER:  buDESOnide  80 MICROgram(s)/formoterol 4.5 MICROgram(s) Inhaler 2 Puff(s) Inhalation two times a day  dexamethasone     Tablet 4 milliGRAM(s) Oral every 6 hours  docusate sodium 100 milliGRAM(s) Oral three times a day  pantoprazole    Tablet 40 milliGRAM(s) Oral before breakfast  senna 2 Tablet(s) Oral at bedtime PRN    IVF:  multivitamin 1 Tablet(s) Oral daily  sodium chloride 0.9% 1000 milliLiter(s) IV Continuous <Continuous>    CULTURES:    RADIOLOGY & ADDITIONAL TESTS:
No events  Small fluid collection noted on exam    Inc c/d/i  Flap viable   No bleeding    Bacitracin  f/u CT
No events  Transferred to [facility] c/d/i    Follow up 11/22 for check up, 11/29 for suture removal
Patient Seen and Examined.     Overnight Events:   None    T(C): 36.6 (11-17-17 @ 23:00), Max: 37.1 (11-17-17 @ 13:15)  HR: 63 (11-18-17 @ 02:00) (56 - 77)  BP: 150/83 (11-17-17 @ 05:13) (150/83 - 150/83)  RR: 10 (11-18-17 @ 02:00) (9 - 21)  SpO2: 91% (11-18-17 @ 02:00) (91% - 99%)    Exam:   Awake, Alert, AOX3  PERRL, EOMI, Face equal, Tongue m/l  5/5 throughout, except LUE 4+/5  SILT    acetaminophen   Tablet 650 milliGRAM(s) Oral every 6 hours PRN  acetaminophen   Tablet. 650 milliGRAM(s) Oral every 6 hours PRN  dexamethasone  Injectable 4 milliGRAM(s) IV Push every 6 hours  docusate sodium 100 milliGRAM(s) Oral three times a day  HYDROmorphone  Injectable 0.5 milliGRAM(s) IV Push once  insulin lispro (HumaLOG) corrective regimen sliding scale   SubCutaneous Before meals and at bedtime  levETIRAcetam 500 milliGRAM(s) Oral every 12 hours  metoclopramide Injectable 10 milliGRAM(s) IV Push once  ondansetron Injectable 4 milliGRAM(s) IV Push every 6 hours PRN  oxyCODONE    IR 5 milliGRAM(s) Oral every 6 hours PRN  oxyCODONE    IR 10 milliGRAM(s) Oral every 6 hours PRN  sodium chloride 0.9% with potassium chloride 20 mEq/L 1000 milliLiter(s) IV Continuous <Continuous>                            12.0   10.5  )-----------( 219      ( 17 Nov 2017 22:53 )             34.2     11-17    141  |  102  |  12  ----------------------------<  138<H>  4.3   |  23  |  0.58    Ca    8.5      17 Nov 2017 22:53  Phos  4.0     11-17  Mg     2.3     11-17      PT/INR - ( 17 Nov 2017 06:03 )   PT: 10.2 sec;   INR: 0.94 ratio         PTT - ( 17 Nov 2017 06:03 )  PTT:24.1 sec    Imaging:
SUBJECTIVE:     OVERNIGHT EVENTS: none    Vital Signs Last 24 Hrs  T(C): 36.7 (20 Nov 2017 08:12), Max: 37 (19 Nov 2017 16:28)  T(F): 98 (20 Nov 2017 08:12), Max: 98.6 (19 Nov 2017 16:28)  HR: 62 (20 Nov 2017 08:12) (58 - 71)  BP: 134/87 (20 Nov 2017 08:12) (130/87 - 155/93)  BP(mean): --  RR: 20 (20 Nov 2017 08:12) (18 - 20)  SpO2: 97% (20 Nov 2017 08:12) (96% - 98%)    PHYSICAL EXAM:    Constitutional: No Acute Distress     Neurological: AOx3, Following Commands, Moving all Extremities 5/5. No drift  Cranial incision sutures C/D/I. PIYUSH EOMI                                                 Pulmonary: Clear to Auscultation, No rales, No rhonchi, No wheezes     Cardiovascular: S1, S2, Regular rate and rhythm     Gastrointestinal: Soft, Non-tender, Non-distended           LABS:                        13.5   7.8   )-----------( 229      ( 19 Nov 2017 06:14 )             39.5    11-19    139  |  98  |  13  ----------------------------<  151<H>  3.9   |  24  |  0.58    Ca    8.8      19 Nov 2017 06:14            IMAGING:         MEDICATIONS:    acetaminophen   Tablet 650 milliGRAM(s) Oral every 6 hours PRN For Temp greater than 38 C (100.4 F)  acetaminophen   Tablet. 650 milliGRAM(s) Oral every 6 hours PRN Mild Pain (1 - 3)  levETIRAcetam 500 milliGRAM(s) Oral every 12 hours  ondansetron Injectable 4 milliGRAM(s) IV Push every 6 hours PRN Nausea and/or Vomiting  oxyCODONE    IR 5 milliGRAM(s) Oral every 6 hours PRN Moderate Pain (4 - 6)  oxyCODONE    IR 10 milliGRAM(s) Oral every 6 hours PRN Severe Pain (7 - 10)  docusate sodium 100 milliGRAM(s) Oral three times a day  dexamethasone     Tablet 4 milliGRAM(s) Oral every 8 hours  enoxaparin Injectable 40 milliGRAM(s) SubCutaneous <User Schedule>        DIET:
SUBJECTIVE: 62F with history of asthma, R occipital GBM s/p R crani on 7/27 s/p RT and temodar was seen in the office today for new left sided weakness since 7 days. She has associated unsteady gait. She obtained an MRI 11/13 which showed recurrent R occipital lesion. She was admitted to hospital for worsening symptoms and planned surgery for Friday. Patient denies headache, nausea, vomiting, numbness, tingling.  She initially presented 4 months ago with difficulty typing and was diagnosed with GBM after resection. She received post-op RT and 1 cycle of Temodar.  11/17 s/p R craniotomy for tumor resection    OVERNIGHT EVENTS: went for mri, able to walk around floor     Vital Signs Last 24 Hrs  T(C): 36.7 (19 Nov 2017 07:41), Max: 36.9 (18 Nov 2017 15:39)  T(F): 98.1 (19 Nov 2017 07:41), Max: 98.5 (18 Nov 2017 15:39)  HR: 72 (19 Nov 2017 07:41) (60 - 80)  BP: 148/81 (19 Nov 2017 07:41) (115/61 - 151/88)  BP(mean): 78 (18 Nov 2017 10:00) (78 - 78)  RR: 18 (19 Nov 2017 07:41) (18 - 18)  SpO2: 95% (19 Nov 2017 07:41) (95% - 97%)    PHYSICAL EXAM:    Constitutional: No Acute Distress     Neurological: AOx3, Following Commands, Moving all Extremities 5/5 except for lue 4+/5                                                   Sensation: [x] intact to light touch  [] decreased:     Pulmonary: Clear to Auscultation, No rales, No rhonchi, No wheezes     Cardiovascular: S1, S2, Regular rate and rhythm     Gastrointestinal: Soft, Non-tender, Non-distended     Extremities: No calf tenderness     Incision: c/d/i  LABS:                        13.5   7.8   )-----------( 229      ( 19 Nov 2017 06:14 )             39.5    11-19    139  |  98  |  13  ----------------------------<  151<H>  3.9   |  24  |  0.58    Ca    8.8      19 Nov 2017 06:14  Phos  4.0     11-17  Mg     2.3     11-17 11-18 @ 07:01 - 11-19 @ 07:00  --------------------------------------------------------  IN: 420 mL / OUT: 1250 mL / NET: -830 mL      DRAINS:     MEDICATIONS:  Anticoagulation:   enoxaparin Injectable 40 milliGRAM(s) SubCutaneous <User Schedule>    Antibiotics:    Endo:  dexamethasone     Tablet 4 milliGRAM(s) Oral every 6 hours  insulin lispro (HumaLOG) corrective regimen sliding scale   SubCutaneous Before meals and at bedtime    Neuro:  acetaminophen   Tablet 650 milliGRAM(s) Oral every 6 hours PRN For Temp greater than 38 C (100.4 F)  acetaminophen   Tablet. 650 milliGRAM(s) Oral every 6 hours PRN Mild Pain (1 - 3)  levETIRAcetam 500 milliGRAM(s) Oral every 12 hours  ondansetron Injectable 4 milliGRAM(s) IV Push every 6 hours PRN Nausea and/or Vomiting  oxyCODONE    IR 5 milliGRAM(s) Oral every 6 hours PRN Moderate Pain (4 - 6)  oxyCODONE    IR 10 milliGRAM(s) Oral every 6 hours PRN Severe Pain (7 - 10)    Cardiac:    Pulm:    GI/:  docusate sodium 100 milliGRAM(s) Oral three times a day    Other:     DIET: regular     IMAGING: mri pending

## 2017-11-20 NOTE — DISCHARGE NOTE ADULT - CARE PLAN
Principal Discharge DX:	Brain tumor, recurrent  Goal:	right parietal occipital craniotomy, microscopic dissection and gross total removal of right parieto-occipital recurrent GBM 11/17/17.  Instructions for follow-up, activity and diet:	Incision evaluation and suture removal in 10-14 days at dr Bank office  Keep Incision Clean and Dry. May shower and get incision wet 11/22/17.  pat dry after. no creams or lotions on incision. No immersion baths..  No strenous activity. No heavy lifting. Do not return to work until cleared by physician. No driving until cleared by physician.  Secondary Diagnosis:	Uncomplicated asthma  Instructions for follow-up, activity and diet:	Continue advair  Follow up with primary care

## 2017-11-20 NOTE — DISCHARGE NOTE ADULT - PATIENT PORTAL LINK FT
“You can access the [facility] Patient Portal, offered by [facility], by registering with the following website: [email or web address]”

## 2017-11-20 NOTE — DISCHARGE NOTE ADULT - ADDITIONAL INSTRUCTIONS
Return to ER if develops fevers, bleeding , wound drainage, uncontrolled pain, weakness of extremities, lethargy or sluggishness..  Follow up with Dr Rodgers in 7-10 days. Follow up final pathology results.  Follow up with Dr Jalloh for Incision evaluation and suture removal in 10-14 days  Follow up with Dr Fontanez for further treatment options

## 2017-11-20 NOTE — PROGRESS NOTE ADULT - PROVIDER SPECIALTY LIST ADULT
NSICU
NSICU
Neurosurgery
Plastic Surgery
NSICU
Plastic Surgery

## 2017-11-20 NOTE — PROGRESS NOTE ADULT - ASSESSMENT
62F s/p craniotomy, tumor re-excision, PRS scalp closure. Staples edema present, patient AVSS    Plan:  >> C/W incision open to air  >> HOB elevation.  >> Care per primary team.

## 2017-11-20 NOTE — DISCHARGE NOTE ADULT - PLAN OF CARE
right parietal occipital craniotomy, microscopic dissection and gross total removal of right parieto-occipital recurrent GBM 11/17/17. Incision evaluation and suture removal in 10-14 days at dr Bank office  Keep Incision Clean and Dry. May shower and get incision wet 11/22/17.  pat dry after. no creams or lotions on incision. No immersion baths..  No strenous activity. No heavy lifting. Do not return to work until cleared by physician. No driving until cleared by physician. Continue advair  Follow up with primary care

## 2017-11-21 ENCOUNTER — APPOINTMENT (OUTPATIENT)
Dept: HEMATOLOGY ONCOLOGY | Facility: CLINIC | Age: 62
End: 2017-11-21

## 2017-11-21 LAB — SURGICAL PATHOLOGY STUDY: SIGNIFICANT CHANGE UP

## 2017-12-06 ENCOUNTER — APPOINTMENT (OUTPATIENT)
Dept: NEUROSURGERY | Facility: CLINIC | Age: 62
End: 2017-12-06
Payer: COMMERCIAL

## 2017-12-06 VITALS
HEART RATE: 79 BPM | TEMPERATURE: 98 F | DIASTOLIC BLOOD PRESSURE: 84 MMHG | BODY MASS INDEX: 35.87 KG/M2 | SYSTOLIC BLOOD PRESSURE: 145 MMHG | HEIGHT: 61 IN | WEIGHT: 190 LBS | OXYGEN SATURATION: 96 %

## 2017-12-06 PROCEDURE — 99024 POSTOP FOLLOW-UP VISIT: CPT

## 2017-12-12 ENCOUNTER — RESULT REVIEW (OUTPATIENT)
Age: 62
End: 2017-12-12

## 2017-12-12 ENCOUNTER — APPOINTMENT (OUTPATIENT)
Dept: NEUROLOGY | Facility: CLINIC | Age: 62
End: 2017-12-12
Payer: COMMERCIAL

## 2017-12-12 ENCOUNTER — APPOINTMENT (OUTPATIENT)
Dept: HEMATOLOGY ONCOLOGY | Facility: CLINIC | Age: 62
End: 2017-12-12

## 2017-12-12 VITALS
TEMPERATURE: 98.1 F | SYSTOLIC BLOOD PRESSURE: 130 MMHG | OXYGEN SATURATION: 95 % | BODY MASS INDEX: 36.63 KG/M2 | HEIGHT: 61 IN | WEIGHT: 194 LBS | DIASTOLIC BLOOD PRESSURE: 70 MMHG | HEART RATE: 78 BPM

## 2017-12-12 PROCEDURE — 99204 OFFICE O/P NEW MOD 45 MIN: CPT

## 2017-12-12 RX ORDER — DEXAMETHASONE 4 MG/1
4 TABLET ORAL
Qty: 30 | Refills: 1 | Status: DISCONTINUED | COMMUNITY
Start: 2017-11-15 | End: 2017-12-12

## 2017-12-12 RX ORDER — ONDANSETRON 8 MG/1
8 TABLET ORAL DAILY
Qty: 30 | Refills: 5 | Status: DISCONTINUED | COMMUNITY
Start: 2017-08-14 | End: 2017-12-12

## 2017-12-12 RX ORDER — TEMOZOLOMIDE 140 MG/1
140 CAPSULE ORAL DAILY
Qty: 10 | Refills: 5 | Status: DISCONTINUED | COMMUNITY
Start: 2017-08-14 | End: 2017-12-12

## 2017-12-13 ENCOUNTER — OUTPATIENT (OUTPATIENT)
Dept: OUTPATIENT SERVICES | Facility: HOSPITAL | Age: 62
LOS: 1 days | Discharge: HOME | End: 2017-12-13

## 2017-12-13 DIAGNOSIS — C71.9 MALIGNANT NEOPLASM OF BRAIN, UNSPECIFIED: ICD-10-CM

## 2017-12-13 DIAGNOSIS — Z98.891 HISTORY OF UTERINE SCAR FROM PREVIOUS SURGERY: Chronic | ICD-10-CM

## 2017-12-13 LAB
ALBUMIN SERPL ELPH-MCNC: 4 G/DL
ALP BLD-CCNC: 70 U/L
ALT SERPL-CCNC: 28 U/L
ANION GAP SERPL CALC-SCNC: 17 MMOL/L
APPEARANCE: CLEAR
AST SERPL-CCNC: 19 U/L
BILIRUB SERPL-MCNC: 0.3 MG/DL
BILIRUBIN URINE: NEGATIVE
BLOOD URINE: NEGATIVE
BUN SERPL-MCNC: 13 MG/DL
CALCIUM SERPL-MCNC: 9.3 MG/DL
CHLORIDE SERPL-SCNC: 98 MMOL/L
CO2 SERPL-SCNC: 24 MMOL/L
COLOR: YELLOW
CREAT SERPL-MCNC: 0.64 MG/DL
GLUCOSE QUALITATIVE U: NEGATIVE MG/DL
GLUCOSE SERPL-MCNC: 103 MG/DL
KETONES URINE: NEGATIVE
LEUKOCYTE ESTERASE URINE: NEGATIVE
NITRITE URINE: NEGATIVE
PH URINE: 7
POTASSIUM SERPL-SCNC: 4.3 MMOL/L
PROT SERPL-MCNC: 6.4 G/DL
PROTEIN URINE: NEGATIVE MG/DL
SODIUM SERPL-SCNC: 139 MMOL/L
SPECIFIC GRAVITY URINE: 1.02
UROBILINOGEN URINE: NEGATIVE MG/DL

## 2017-12-19 ENCOUNTER — APPOINTMENT (OUTPATIENT)
Dept: INFUSION THERAPY | Facility: HOSPITAL | Age: 62
End: 2017-12-19

## 2017-12-19 ENCOUNTER — RESULT REVIEW (OUTPATIENT)
Age: 62
End: 2017-12-19

## 2017-12-19 LAB
BASOPHILS # BLD AUTO: 0 K/UL — SIGNIFICANT CHANGE UP (ref 0–0.2)
BASOPHILS NFR BLD AUTO: 0 % — SIGNIFICANT CHANGE UP (ref 0–2)
EOSINOPHIL # BLD AUTO: 0.1 K/UL — SIGNIFICANT CHANGE UP (ref 0–0.5)
EOSINOPHIL NFR BLD AUTO: 3.2 % — SIGNIFICANT CHANGE UP (ref 0–6)
LYMPHOCYTES # BLD AUTO: 0.5 K/UL — LOW (ref 1–3.3)
LYMPHOCYTES # BLD AUTO: 19.7 % — SIGNIFICANT CHANGE UP (ref 13–44)
MONOCYTES # BLD AUTO: 0.3 K/UL — SIGNIFICANT CHANGE UP (ref 0–0.9)
MONOCYTES NFR BLD AUTO: 10.3 % — SIGNIFICANT CHANGE UP (ref 2–14)
NEUTROPHILS # BLD AUTO: 1.8 K/UL — SIGNIFICANT CHANGE UP (ref 1.8–7.4)
NEUTROPHILS NFR BLD AUTO: 66.9 % — SIGNIFICANT CHANGE UP (ref 43–77)

## 2017-12-20 DIAGNOSIS — Z51.11 ENCOUNTER FOR ANTINEOPLASTIC CHEMOTHERAPY: ICD-10-CM

## 2018-01-02 ENCOUNTER — APPOINTMENT (OUTPATIENT)
Dept: INFUSION THERAPY | Facility: HOSPITAL | Age: 63
End: 2018-01-02

## 2018-01-02 ENCOUNTER — APPOINTMENT (OUTPATIENT)
Dept: NEUROLOGY | Facility: CLINIC | Age: 63
End: 2018-01-02
Payer: COMMERCIAL

## 2018-01-02 ENCOUNTER — APPOINTMENT (OUTPATIENT)
Dept: NEUROLOGY | Facility: CLINIC | Age: 63
End: 2018-01-02

## 2018-01-02 VITALS
TEMPERATURE: 98.3 F | OXYGEN SATURATION: 97 % | DIASTOLIC BLOOD PRESSURE: 80 MMHG | HEIGHT: 61 IN | WEIGHT: 196 LBS | HEART RATE: 93 BPM | SYSTOLIC BLOOD PRESSURE: 120 MMHG | BODY MASS INDEX: 37 KG/M2

## 2018-01-02 PROCEDURE — 99214 OFFICE O/P EST MOD 30 MIN: CPT

## 2018-01-04 ENCOUNTER — APPOINTMENT (OUTPATIENT)
Dept: PULMONOLOGY | Facility: CLINIC | Age: 63
End: 2018-01-04

## 2018-01-11 ENCOUNTER — TRANSCRIPTION ENCOUNTER (OUTPATIENT)
Age: 63
End: 2018-01-11

## 2018-01-11 ENCOUNTER — OUTPATIENT (OUTPATIENT)
Dept: OUTPATIENT SERVICES | Facility: HOSPITAL | Age: 63
LOS: 1 days | Discharge: HOME | End: 2018-01-11

## 2018-01-11 DIAGNOSIS — C71.9 MALIGNANT NEOPLASM OF BRAIN, UNSPECIFIED: ICD-10-CM

## 2018-01-11 DIAGNOSIS — Z98.891 HISTORY OF UTERINE SCAR FROM PREVIOUS SURGERY: Chronic | ICD-10-CM

## 2018-01-16 ENCOUNTER — APPOINTMENT (OUTPATIENT)
Dept: NEUROLOGY | Facility: CLINIC | Age: 63
End: 2018-01-16
Payer: COMMERCIAL

## 2018-01-16 ENCOUNTER — RESULT REVIEW (OUTPATIENT)
Age: 63
End: 2018-01-16

## 2018-01-16 ENCOUNTER — LABORATORY RESULT (OUTPATIENT)
Age: 63
End: 2018-01-16

## 2018-01-16 ENCOUNTER — APPOINTMENT (OUTPATIENT)
Dept: INFUSION THERAPY | Facility: HOSPITAL | Age: 63
End: 2018-01-16

## 2018-01-16 ENCOUNTER — APPOINTMENT (OUTPATIENT)
Dept: NEUROLOGY | Facility: CLINIC | Age: 63
End: 2018-01-16

## 2018-01-16 VITALS
SYSTOLIC BLOOD PRESSURE: 124 MMHG | OXYGEN SATURATION: 98 % | RESPIRATION RATE: 16 BRPM | DIASTOLIC BLOOD PRESSURE: 80 MMHG | HEART RATE: 86 BPM

## 2018-01-16 PROCEDURE — 99214 OFFICE O/P EST MOD 30 MIN: CPT

## 2018-01-17 DIAGNOSIS — Z51.11 ENCOUNTER FOR ANTINEOPLASTIC CHEMOTHERAPY: ICD-10-CM

## 2018-01-30 ENCOUNTER — LABORATORY RESULT (OUTPATIENT)
Age: 63
End: 2018-01-30

## 2018-01-30 ENCOUNTER — APPOINTMENT (OUTPATIENT)
Dept: INFUSION THERAPY | Facility: HOSPITAL | Age: 63
End: 2018-01-30

## 2018-01-30 ENCOUNTER — APPOINTMENT (OUTPATIENT)
Dept: INTERNAL MEDICINE | Facility: CLINIC | Age: 63
End: 2018-01-30

## 2018-01-30 ENCOUNTER — RESULT REVIEW (OUTPATIENT)
Age: 63
End: 2018-01-30

## 2018-01-30 LAB
BASOPHILS # BLD AUTO: 0.1 K/UL — SIGNIFICANT CHANGE UP (ref 0–0.2)
BASOPHILS NFR BLD AUTO: 0.5 % — SIGNIFICANT CHANGE UP (ref 0–2)
EOSINOPHIL # BLD AUTO: 0.2 K/UL — SIGNIFICANT CHANGE UP (ref 0–0.5)
EOSINOPHIL NFR BLD AUTO: 1.8 % — SIGNIFICANT CHANGE UP (ref 0–6)
LYMPHOCYTES # BLD AUTO: 1.6 K/UL — SIGNIFICANT CHANGE UP (ref 1–3.3)
LYMPHOCYTES # BLD AUTO: 15.2 % — SIGNIFICANT CHANGE UP (ref 13–44)
MONOCYTES # BLD AUTO: 0.7 K/UL — SIGNIFICANT CHANGE UP (ref 0–0.9)
MONOCYTES NFR BLD AUTO: 6.5 % — SIGNIFICANT CHANGE UP (ref 2–14)
NEUTROPHILS # BLD AUTO: 8 K/UL — HIGH (ref 1.8–7.4)
NEUTROPHILS NFR BLD AUTO: 76 % — SIGNIFICANT CHANGE UP (ref 43–77)

## 2018-02-06 ENCOUNTER — APPOINTMENT (OUTPATIENT)
Dept: MRI IMAGING | Facility: IMAGING CENTER | Age: 63
End: 2018-02-06
Payer: COMMERCIAL

## 2018-02-06 ENCOUNTER — OUTPATIENT (OUTPATIENT)
Dept: OUTPATIENT SERVICES | Facility: HOSPITAL | Age: 63
LOS: 1 days | End: 2018-02-06
Payer: COMMERCIAL

## 2018-02-06 DIAGNOSIS — Z98.891 HISTORY OF UTERINE SCAR FROM PREVIOUS SURGERY: Chronic | ICD-10-CM

## 2018-02-06 DIAGNOSIS — Z00.8 ENCOUNTER FOR OTHER GENERAL EXAMINATION: ICD-10-CM

## 2018-02-06 PROCEDURE — 70553 MRI BRAIN STEM W/O & W/DYE: CPT

## 2018-02-06 PROCEDURE — 70553 MRI BRAIN STEM W/O & W/DYE: CPT | Mod: 26

## 2018-02-06 PROCEDURE — A9585: CPT

## 2018-02-07 ENCOUNTER — APPOINTMENT (OUTPATIENT)
Dept: NEUROLOGY | Facility: CLINIC | Age: 63
End: 2018-02-07
Payer: COMMERCIAL

## 2018-02-07 VITALS
SYSTOLIC BLOOD PRESSURE: 151 MMHG | HEART RATE: 77 BPM | OXYGEN SATURATION: 98 % | TEMPERATURE: 98.2 F | HEIGHT: 61 IN | DIASTOLIC BLOOD PRESSURE: 87 MMHG

## 2018-02-07 PROCEDURE — 99214 OFFICE O/P EST MOD 30 MIN: CPT

## 2018-02-08 ENCOUNTER — OUTPATIENT (OUTPATIENT)
Dept: OUTPATIENT SERVICES | Facility: HOSPITAL | Age: 63
LOS: 1 days | Discharge: HOME | End: 2018-02-08

## 2018-02-08 DIAGNOSIS — C71.9 MALIGNANT NEOPLASM OF BRAIN, UNSPECIFIED: ICD-10-CM

## 2018-02-08 DIAGNOSIS — Z98.891 HISTORY OF UTERINE SCAR FROM PREVIOUS SURGERY: Chronic | ICD-10-CM

## 2018-02-10 ENCOUNTER — APPOINTMENT (OUTPATIENT)
Dept: INTERNAL MEDICINE | Facility: CLINIC | Age: 63
End: 2018-02-10
Payer: COMMERCIAL

## 2018-02-10 ENCOUNTER — RX RENEWAL (OUTPATIENT)
Age: 63
End: 2018-02-10

## 2018-02-10 VITALS
HEIGHT: 61 IN | BODY MASS INDEX: 37 KG/M2 | DIASTOLIC BLOOD PRESSURE: 80 MMHG | RESPIRATION RATE: 16 BRPM | WEIGHT: 196 LBS | HEART RATE: 80 BPM | TEMPERATURE: 98.8 F | SYSTOLIC BLOOD PRESSURE: 140 MMHG | OXYGEN SATURATION: 97 %

## 2018-02-10 DIAGNOSIS — R51 HEADACHE: ICD-10-CM

## 2018-02-10 PROCEDURE — 99214 OFFICE O/P EST MOD 30 MIN: CPT

## 2018-02-12 ENCOUNTER — OTHER (OUTPATIENT)
Age: 63
End: 2018-02-12

## 2018-02-13 ENCOUNTER — RESULT REVIEW (OUTPATIENT)
Age: 63
End: 2018-02-13

## 2018-02-13 ENCOUNTER — LABORATORY RESULT (OUTPATIENT)
Age: 63
End: 2018-02-13

## 2018-02-13 ENCOUNTER — APPOINTMENT (OUTPATIENT)
Dept: INFUSION THERAPY | Facility: HOSPITAL | Age: 63
End: 2018-02-13

## 2018-02-14 DIAGNOSIS — Z51.11 ENCOUNTER FOR ANTINEOPLASTIC CHEMOTHERAPY: ICD-10-CM

## 2018-02-20 ENCOUNTER — APPOINTMENT (OUTPATIENT)
Dept: INFUSION THERAPY | Facility: HOSPITAL | Age: 63
End: 2018-02-20

## 2018-03-06 ENCOUNTER — LABORATORY RESULT (OUTPATIENT)
Age: 63
End: 2018-03-06

## 2018-03-06 ENCOUNTER — APPOINTMENT (OUTPATIENT)
Dept: INFUSION THERAPY | Facility: HOSPITAL | Age: 63
End: 2018-03-06

## 2018-03-06 ENCOUNTER — RESULT REVIEW (OUTPATIENT)
Age: 63
End: 2018-03-06

## 2018-03-06 LAB
HCT VFR BLD CALC: 39.4 % — SIGNIFICANT CHANGE UP (ref 34.5–45)
HGB BLD-MCNC: 14.1 G/DL — SIGNIFICANT CHANGE UP (ref 11.5–15.5)
MCHC RBC-ENTMCNC: 35.7 G/DL — SIGNIFICANT CHANGE UP (ref 32–36)
MCHC RBC-ENTMCNC: 36.5 PG — HIGH (ref 27–34)
MCV RBC AUTO: 102 FL — HIGH (ref 80–100)
PLATELET # BLD AUTO: 236 K/UL — SIGNIFICANT CHANGE UP (ref 150–400)
RBC # BLD: 3.86 M/UL — SIGNIFICANT CHANGE UP (ref 3.8–5.2)
RBC # FLD: 12.8 % — SIGNIFICANT CHANGE UP (ref 10.3–14.5)
WBC # BLD: 6.6 K/UL — SIGNIFICANT CHANGE UP (ref 3.8–10.5)
WBC # FLD AUTO: 6.6 K/UL — SIGNIFICANT CHANGE UP (ref 3.8–10.5)

## 2018-03-07 ENCOUNTER — APPOINTMENT (OUTPATIENT)
Dept: NEUROSURGERY | Facility: CLINIC | Age: 63
End: 2018-03-07

## 2018-03-13 ENCOUNTER — APPOINTMENT (OUTPATIENT)
Dept: NEUROLOGY | Facility: CLINIC | Age: 63
End: 2018-03-13
Payer: COMMERCIAL

## 2018-03-13 ENCOUNTER — APPOINTMENT (OUTPATIENT)
Dept: NEUROLOGY | Facility: CLINIC | Age: 63
End: 2018-03-13

## 2018-03-13 VITALS
SYSTOLIC BLOOD PRESSURE: 144 MMHG | HEART RATE: 80 BPM | BODY MASS INDEX: 37 KG/M2 | DIASTOLIC BLOOD PRESSURE: 89 MMHG | OXYGEN SATURATION: 97 % | HEIGHT: 61 IN | WEIGHT: 196 LBS | TEMPERATURE: 98.7 F

## 2018-03-13 PROCEDURE — 99214 OFFICE O/P EST MOD 30 MIN: CPT

## 2018-03-14 ENCOUNTER — APPOINTMENT (OUTPATIENT)
Dept: NEUROSURGERY | Facility: CLINIC | Age: 63
End: 2018-03-14
Payer: COMMERCIAL

## 2018-03-14 VITALS — SYSTOLIC BLOOD PRESSURE: 149 MMHG | DIASTOLIC BLOOD PRESSURE: 95 MMHG

## 2018-03-14 VITALS
HEIGHT: 61 IN | OXYGEN SATURATION: 95 % | HEART RATE: 94 BPM | DIASTOLIC BLOOD PRESSURE: 99 MMHG | WEIGHT: 185 LBS | TEMPERATURE: 98 F | SYSTOLIC BLOOD PRESSURE: 156 MMHG | BODY MASS INDEX: 34.93 KG/M2

## 2018-03-14 PROCEDURE — 99214 OFFICE O/P EST MOD 30 MIN: CPT

## 2018-03-15 ENCOUNTER — OUTPATIENT (OUTPATIENT)
Dept: OUTPATIENT SERVICES | Facility: HOSPITAL | Age: 63
LOS: 1 days | Discharge: HOME | End: 2018-03-15

## 2018-03-15 DIAGNOSIS — Z98.891 HISTORY OF UTERINE SCAR FROM PREVIOUS SURGERY: Chronic | ICD-10-CM

## 2018-03-15 DIAGNOSIS — C71.9 MALIGNANT NEOPLASM OF BRAIN, UNSPECIFIED: ICD-10-CM

## 2018-03-20 ENCOUNTER — RESULT REVIEW (OUTPATIENT)
Age: 63
End: 2018-03-20

## 2018-03-20 ENCOUNTER — APPOINTMENT (OUTPATIENT)
Dept: INFUSION THERAPY | Facility: HOSPITAL | Age: 63
End: 2018-03-20

## 2018-03-20 ENCOUNTER — LABORATORY RESULT (OUTPATIENT)
Age: 63
End: 2018-03-20

## 2018-03-20 LAB
HCT VFR BLD CALC: 38.7 % — SIGNIFICANT CHANGE UP (ref 34.5–45)
HGB BLD-MCNC: 13.8 G/DL — SIGNIFICANT CHANGE UP (ref 11.5–15.5)
MCHC RBC-ENTMCNC: 35.7 G/DL — SIGNIFICANT CHANGE UP (ref 32–36)
MCHC RBC-ENTMCNC: 36.5 PG — HIGH (ref 27–34)
MCV RBC AUTO: 102 FL — HIGH (ref 80–100)
PLATELET # BLD AUTO: 247 K/UL — SIGNIFICANT CHANGE UP (ref 150–400)
RBC # BLD: 3.79 M/UL — LOW (ref 3.8–5.2)
RBC # FLD: 12.8 % — SIGNIFICANT CHANGE UP (ref 10.3–14.5)
WBC # BLD: 8.5 K/UL — SIGNIFICANT CHANGE UP (ref 3.8–10.5)
WBC # FLD AUTO: 8.5 K/UL — SIGNIFICANT CHANGE UP (ref 3.8–10.5)

## 2018-03-21 DIAGNOSIS — Z51.11 ENCOUNTER FOR ANTINEOPLASTIC CHEMOTHERAPY: ICD-10-CM

## 2018-04-03 ENCOUNTER — LABORATORY RESULT (OUTPATIENT)
Age: 63
End: 2018-04-03

## 2018-04-03 ENCOUNTER — APPOINTMENT (OUTPATIENT)
Dept: INFUSION THERAPY | Facility: HOSPITAL | Age: 63
End: 2018-04-03

## 2018-04-03 ENCOUNTER — RESULT REVIEW (OUTPATIENT)
Age: 63
End: 2018-04-03

## 2018-04-03 LAB
HCT VFR BLD CALC: 40.5 % — SIGNIFICANT CHANGE UP (ref 34.5–45)
HGB BLD-MCNC: 14.3 G/DL — SIGNIFICANT CHANGE UP (ref 11.5–15.5)
MCHC RBC-ENTMCNC: 35.3 G/DL — SIGNIFICANT CHANGE UP (ref 32–36)
MCHC RBC-ENTMCNC: 35.7 PG — HIGH (ref 27–34)
MCV RBC AUTO: 101 FL — HIGH (ref 80–100)
PLATELET # BLD AUTO: 217 K/UL — SIGNIFICANT CHANGE UP (ref 150–400)
RBC # BLD: 4.01 M/UL — SIGNIFICANT CHANGE UP (ref 3.8–5.2)
RBC # FLD: 12.6 % — SIGNIFICANT CHANGE UP (ref 10.3–14.5)
WBC # BLD: 9.6 K/UL — SIGNIFICANT CHANGE UP (ref 3.8–10.5)
WBC # FLD AUTO: 9.6 K/UL — SIGNIFICANT CHANGE UP (ref 3.8–10.5)

## 2018-04-07 ENCOUNTER — LABORATORY RESULT (OUTPATIENT)
Age: 63
End: 2018-04-07

## 2018-04-07 ENCOUNTER — APPOINTMENT (OUTPATIENT)
Dept: INTERNAL MEDICINE | Facility: CLINIC | Age: 63
End: 2018-04-07
Payer: COMMERCIAL

## 2018-04-07 VITALS
BODY MASS INDEX: 34.93 KG/M2 | RESPIRATION RATE: 16 BRPM | HEART RATE: 72 BPM | TEMPERATURE: 98.4 F | HEIGHT: 61 IN | SYSTOLIC BLOOD PRESSURE: 160 MMHG | WEIGHT: 185 LBS | DIASTOLIC BLOOD PRESSURE: 100 MMHG | OXYGEN SATURATION: 98 %

## 2018-04-07 VITALS — DIASTOLIC BLOOD PRESSURE: 98 MMHG | SYSTOLIC BLOOD PRESSURE: 156 MMHG

## 2018-04-07 DIAGNOSIS — Z87.440 PERSONAL HISTORY OF URINARY (TRACT) INFECTIONS: ICD-10-CM

## 2018-04-07 PROCEDURE — 99213 OFFICE O/P EST LOW 20 MIN: CPT

## 2018-04-09 LAB
APPEARANCE: CLEAR
BILIRUBIN URINE: NEGATIVE
BLOOD URINE: NEGATIVE
COLOR: YELLOW
GLUCOSE QUALITATIVE U: NEGATIVE MG/DL
KETONES URINE: NEGATIVE
LEUKOCYTE ESTERASE URINE: ABNORMAL
NITRITE URINE: NEGATIVE
PH URINE: 5
PROTEIN URINE: NEGATIVE MG/DL
SPECIFIC GRAVITY URINE: 1.02
UROBILINOGEN URINE: NEGATIVE MG/DL

## 2018-04-10 LAB — BACTERIA UR CULT: ABNORMAL

## 2018-04-12 ENCOUNTER — OUTPATIENT (OUTPATIENT)
Dept: OUTPATIENT SERVICES | Facility: HOSPITAL | Age: 63
LOS: 1 days | Discharge: HOME | End: 2018-04-12

## 2018-04-12 DIAGNOSIS — C71.9 MALIGNANT NEOPLASM OF BRAIN, UNSPECIFIED: ICD-10-CM

## 2018-04-12 DIAGNOSIS — Z98.891 HISTORY OF UTERINE SCAR FROM PREVIOUS SURGERY: Chronic | ICD-10-CM

## 2018-04-17 ENCOUNTER — APPOINTMENT (OUTPATIENT)
Dept: MRI IMAGING | Facility: IMAGING CENTER | Age: 63
End: 2018-04-17
Payer: COMMERCIAL

## 2018-04-17 ENCOUNTER — RESULT REVIEW (OUTPATIENT)
Age: 63
End: 2018-04-17

## 2018-04-17 ENCOUNTER — LABORATORY RESULT (OUTPATIENT)
Age: 63
End: 2018-04-17

## 2018-04-17 ENCOUNTER — APPOINTMENT (OUTPATIENT)
Dept: NEUROLOGY | Facility: CLINIC | Age: 63
End: 2018-04-17
Payer: COMMERCIAL

## 2018-04-17 ENCOUNTER — APPOINTMENT (OUTPATIENT)
Dept: INFUSION THERAPY | Facility: HOSPITAL | Age: 63
End: 2018-04-17

## 2018-04-17 ENCOUNTER — OUTPATIENT (OUTPATIENT)
Dept: OUTPATIENT SERVICES | Facility: HOSPITAL | Age: 63
LOS: 1 days | End: 2018-04-17
Payer: COMMERCIAL

## 2018-04-17 DIAGNOSIS — Z00.8 ENCOUNTER FOR OTHER GENERAL EXAMINATION: ICD-10-CM

## 2018-04-17 DIAGNOSIS — Z98.891 HISTORY OF UTERINE SCAR FROM PREVIOUS SURGERY: Chronic | ICD-10-CM

## 2018-04-17 LAB
HCT VFR BLD CALC: 38.7 % — SIGNIFICANT CHANGE UP (ref 34.5–45)
HGB BLD-MCNC: 14.2 G/DL — SIGNIFICANT CHANGE UP (ref 11.5–15.5)
MCHC RBC-ENTMCNC: 36.6 G/DL — HIGH (ref 32–36)
MCHC RBC-ENTMCNC: 36.9 PG — HIGH (ref 27–34)
MCV RBC AUTO: 101 FL — HIGH (ref 80–100)
PLATELET # BLD AUTO: 203 K/UL — SIGNIFICANT CHANGE UP (ref 150–400)
RBC # BLD: 3.84 M/UL — SIGNIFICANT CHANGE UP (ref 3.8–5.2)
RBC # FLD: 12.8 % — SIGNIFICANT CHANGE UP (ref 10.3–14.5)
WBC # BLD: 6.7 K/UL — SIGNIFICANT CHANGE UP (ref 3.8–10.5)
WBC # FLD AUTO: 6.7 K/UL — SIGNIFICANT CHANGE UP (ref 3.8–10.5)

## 2018-04-17 PROCEDURE — 70553 MRI BRAIN STEM W/O & W/DYE: CPT

## 2018-04-17 PROCEDURE — 70553 MRI BRAIN STEM W/O & W/DYE: CPT | Mod: 26

## 2018-04-17 PROCEDURE — A9585: CPT

## 2018-04-17 PROCEDURE — 99214 OFFICE O/P EST MOD 30 MIN: CPT

## 2018-04-18 DIAGNOSIS — Z51.11 ENCOUNTER FOR ANTINEOPLASTIC CHEMOTHERAPY: ICD-10-CM

## 2018-04-28 ENCOUNTER — APPOINTMENT (OUTPATIENT)
Dept: INTERNAL MEDICINE | Facility: CLINIC | Age: 63
End: 2018-04-28
Payer: COMMERCIAL

## 2018-04-28 VITALS
WEIGHT: 190 LBS | HEIGHT: 61 IN | RESPIRATION RATE: 14 BRPM | TEMPERATURE: 97.9 F | DIASTOLIC BLOOD PRESSURE: 88 MMHG | OXYGEN SATURATION: 97 % | SYSTOLIC BLOOD PRESSURE: 142 MMHG | HEART RATE: 80 BPM | BODY MASS INDEX: 35.87 KG/M2

## 2018-04-28 PROCEDURE — 99213 OFFICE O/P EST LOW 20 MIN: CPT

## 2018-04-28 RX ORDER — NITROFURANTOIN (MONOHYDRATE/MACROCRYSTALS) 25; 75 MG/1; MG/1
100 CAPSULE ORAL
Qty: 14 | Refills: 0 | Status: COMPLETED | COMMUNITY
Start: 2018-04-10 | End: 2018-04-28

## 2018-05-01 ENCOUNTER — LABORATORY RESULT (OUTPATIENT)
Age: 63
End: 2018-05-01

## 2018-05-01 ENCOUNTER — RESULT REVIEW (OUTPATIENT)
Age: 63
End: 2018-05-01

## 2018-05-01 ENCOUNTER — APPOINTMENT (OUTPATIENT)
Dept: INFUSION THERAPY | Facility: HOSPITAL | Age: 63
End: 2018-05-01

## 2018-05-01 LAB
HCT VFR BLD CALC: 39.6 % — SIGNIFICANT CHANGE UP (ref 34.5–45)
HGB BLD-MCNC: 14.1 G/DL — SIGNIFICANT CHANGE UP (ref 11.5–15.5)
MCHC RBC-ENTMCNC: 35.6 G/DL — SIGNIFICANT CHANGE UP (ref 32–36)
MCHC RBC-ENTMCNC: 36.1 PG — HIGH (ref 27–34)
MCV RBC AUTO: 101 FL — HIGH (ref 80–100)
PLATELET # BLD AUTO: 224 K/UL — SIGNIFICANT CHANGE UP (ref 150–400)
RBC # BLD: 3.91 M/UL — SIGNIFICANT CHANGE UP (ref 3.8–5.2)
RBC # FLD: 13.3 % — SIGNIFICANT CHANGE UP (ref 10.3–14.5)
WBC # BLD: 10.2 K/UL — SIGNIFICANT CHANGE UP (ref 3.8–10.5)
WBC # FLD AUTO: 10.2 K/UL — SIGNIFICANT CHANGE UP (ref 3.8–10.5)

## 2018-05-10 ENCOUNTER — OUTPATIENT (OUTPATIENT)
Dept: OUTPATIENT SERVICES | Facility: HOSPITAL | Age: 63
LOS: 1 days | Discharge: HOME | End: 2018-05-10

## 2018-05-10 DIAGNOSIS — Z98.891 HISTORY OF UTERINE SCAR FROM PREVIOUS SURGERY: Chronic | ICD-10-CM

## 2018-05-10 DIAGNOSIS — C71.9 MALIGNANT NEOPLASM OF BRAIN, UNSPECIFIED: ICD-10-CM

## 2018-05-14 ENCOUNTER — APPOINTMENT (OUTPATIENT)
Dept: INTERNAL MEDICINE | Facility: CLINIC | Age: 63
End: 2018-05-14
Payer: COMMERCIAL

## 2018-05-14 VITALS
SYSTOLIC BLOOD PRESSURE: 138 MMHG | DIASTOLIC BLOOD PRESSURE: 64 MMHG | HEART RATE: 76 BPM | WEIGHT: 190 LBS | TEMPERATURE: 98.7 F | BODY MASS INDEX: 35.87 KG/M2 | RESPIRATION RATE: 14 BRPM | HEIGHT: 61 IN | OXYGEN SATURATION: 98 %

## 2018-05-14 PROCEDURE — 99214 OFFICE O/P EST MOD 30 MIN: CPT

## 2018-05-15 ENCOUNTER — LABORATORY RESULT (OUTPATIENT)
Age: 63
End: 2018-05-15

## 2018-05-15 ENCOUNTER — RESULT REVIEW (OUTPATIENT)
Age: 63
End: 2018-05-15

## 2018-05-15 ENCOUNTER — APPOINTMENT (OUTPATIENT)
Dept: INFUSION THERAPY | Facility: HOSPITAL | Age: 63
End: 2018-05-15

## 2018-05-15 ENCOUNTER — APPOINTMENT (OUTPATIENT)
Dept: NEUROLOGY | Facility: CLINIC | Age: 63
End: 2018-05-15
Payer: COMMERCIAL

## 2018-05-15 VITALS — BODY MASS INDEX: 37.41 KG/M2 | WEIGHT: 198 LBS

## 2018-05-15 VITALS
SYSTOLIC BLOOD PRESSURE: 131 MMHG | HEIGHT: 61 IN | HEART RATE: 83 BPM | OXYGEN SATURATION: 98 % | TEMPERATURE: 98.6 F | DIASTOLIC BLOOD PRESSURE: 87 MMHG | RESPIRATION RATE: 14 BRPM

## 2018-05-15 LAB
HCT VFR BLD CALC: 36.8 % — SIGNIFICANT CHANGE UP (ref 34.5–45)
HGB BLD-MCNC: 13.1 G/DL — SIGNIFICANT CHANGE UP (ref 11.5–15.5)
MCHC RBC-ENTMCNC: 35.5 G/DL — SIGNIFICANT CHANGE UP (ref 32–36)
MCHC RBC-ENTMCNC: 35.9 PG — HIGH (ref 27–34)
MCV RBC AUTO: 101 FL — HIGH (ref 80–100)
PLATELET # BLD AUTO: 208 K/UL — SIGNIFICANT CHANGE UP (ref 150–400)
RBC # BLD: 3.65 M/UL — LOW (ref 3.8–5.2)
RBC # FLD: 13.4 % — SIGNIFICANT CHANGE UP (ref 10.3–14.5)
WBC # BLD: 9.1 K/UL — SIGNIFICANT CHANGE UP (ref 3.8–10.5)
WBC # FLD AUTO: 9.1 K/UL — SIGNIFICANT CHANGE UP (ref 3.8–10.5)

## 2018-05-15 PROCEDURE — 99214 OFFICE O/P EST MOD 30 MIN: CPT

## 2018-05-16 DIAGNOSIS — Z51.11 ENCOUNTER FOR ANTINEOPLASTIC CHEMOTHERAPY: ICD-10-CM

## 2018-05-29 ENCOUNTER — RESULT REVIEW (OUTPATIENT)
Age: 63
End: 2018-05-29

## 2018-05-29 ENCOUNTER — LABORATORY RESULT (OUTPATIENT)
Age: 63
End: 2018-05-29

## 2018-05-29 ENCOUNTER — APPOINTMENT (OUTPATIENT)
Dept: INFUSION THERAPY | Facility: HOSPITAL | Age: 63
End: 2018-05-29

## 2018-05-29 LAB
HCT VFR BLD CALC: 39.9 % — SIGNIFICANT CHANGE UP (ref 34.5–45)
HGB BLD-MCNC: 14 G/DL — SIGNIFICANT CHANGE UP (ref 11.5–15.5)
MCHC RBC-ENTMCNC: 35.1 G/DL — SIGNIFICANT CHANGE UP (ref 32–36)
MCHC RBC-ENTMCNC: 36 PG — HIGH (ref 27–34)
MCV RBC AUTO: 103 FL — HIGH (ref 80–100)
PLATELET # BLD AUTO: 185 K/UL — SIGNIFICANT CHANGE UP (ref 150–400)
RBC # BLD: 3.89 M/UL — SIGNIFICANT CHANGE UP (ref 3.8–5.2)
RBC # FLD: 13.9 % — SIGNIFICANT CHANGE UP (ref 10.3–14.5)
WBC # BLD: 8.7 K/UL — SIGNIFICANT CHANGE UP (ref 3.8–10.5)
WBC # FLD AUTO: 8.7 K/UL — SIGNIFICANT CHANGE UP (ref 3.8–10.5)

## 2018-06-08 ENCOUNTER — OUTPATIENT (OUTPATIENT)
Dept: OUTPATIENT SERVICES | Facility: HOSPITAL | Age: 63
LOS: 1 days | Discharge: HOME | End: 2018-06-08

## 2018-06-08 DIAGNOSIS — Z98.891 HISTORY OF UTERINE SCAR FROM PREVIOUS SURGERY: Chronic | ICD-10-CM

## 2018-06-08 DIAGNOSIS — C71.9 MALIGNANT NEOPLASM OF BRAIN, UNSPECIFIED: ICD-10-CM

## 2018-06-12 ENCOUNTER — LABORATORY RESULT (OUTPATIENT)
Age: 63
End: 2018-06-12

## 2018-06-12 ENCOUNTER — OUTPATIENT (OUTPATIENT)
Dept: OUTPATIENT SERVICES | Facility: HOSPITAL | Age: 63
LOS: 1 days | End: 2018-06-12
Payer: COMMERCIAL

## 2018-06-12 ENCOUNTER — APPOINTMENT (OUTPATIENT)
Dept: NEUROLOGY | Facility: CLINIC | Age: 63
End: 2018-06-12
Payer: COMMERCIAL

## 2018-06-12 ENCOUNTER — APPOINTMENT (OUTPATIENT)
Dept: INFUSION THERAPY | Facility: HOSPITAL | Age: 63
End: 2018-06-12

## 2018-06-12 ENCOUNTER — APPOINTMENT (OUTPATIENT)
Dept: MRI IMAGING | Facility: IMAGING CENTER | Age: 63
End: 2018-06-12
Payer: COMMERCIAL

## 2018-06-12 ENCOUNTER — RESULT REVIEW (OUTPATIENT)
Age: 63
End: 2018-06-12

## 2018-06-12 VITALS
BODY MASS INDEX: 37.38 KG/M2 | SYSTOLIC BLOOD PRESSURE: 146 MMHG | HEART RATE: 62 BPM | HEIGHT: 61 IN | DIASTOLIC BLOOD PRESSURE: 87 MMHG | TEMPERATURE: 98.7 F | OXYGEN SATURATION: 99 % | RESPIRATION RATE: 16 BRPM | WEIGHT: 198 LBS

## 2018-06-12 DIAGNOSIS — Z00.8 ENCOUNTER FOR OTHER GENERAL EXAMINATION: ICD-10-CM

## 2018-06-12 DIAGNOSIS — Z98.891 HISTORY OF UTERINE SCAR FROM PREVIOUS SURGERY: Chronic | ICD-10-CM

## 2018-06-12 LAB
HCT VFR BLD CALC: 38.9 % — SIGNIFICANT CHANGE UP (ref 34.5–45)
HGB BLD-MCNC: 13.6 G/DL — SIGNIFICANT CHANGE UP (ref 11.5–15.5)
MCHC RBC-ENTMCNC: 34.9 G/DL — SIGNIFICANT CHANGE UP (ref 32–36)
MCHC RBC-ENTMCNC: 36.3 PG — HIGH (ref 27–34)
MCV RBC AUTO: 104 FL — HIGH (ref 80–100)
PLATELET # BLD AUTO: 202 K/UL — SIGNIFICANT CHANGE UP (ref 150–400)
RBC # BLD: 3.73 M/UL — LOW (ref 3.8–5.2)
RBC # FLD: 13.9 % — SIGNIFICANT CHANGE UP (ref 10.3–14.5)
WBC # BLD: 6.1 K/UL — SIGNIFICANT CHANGE UP (ref 3.8–10.5)
WBC # FLD AUTO: 6.1 K/UL — SIGNIFICANT CHANGE UP (ref 3.8–10.5)

## 2018-06-12 PROCEDURE — 99214 OFFICE O/P EST MOD 30 MIN: CPT

## 2018-06-12 PROCEDURE — 70553 MRI BRAIN STEM W/O & W/DYE: CPT

## 2018-06-12 PROCEDURE — 70553 MRI BRAIN STEM W/O & W/DYE: CPT | Mod: 26

## 2018-06-12 PROCEDURE — A9585: CPT

## 2018-06-13 ENCOUNTER — APPOINTMENT (OUTPATIENT)
Dept: NEUROSURGERY | Facility: CLINIC | Age: 63
End: 2018-06-13
Payer: COMMERCIAL

## 2018-06-13 VITALS
RESPIRATION RATE: 18 BRPM | DIASTOLIC BLOOD PRESSURE: 86 MMHG | TEMPERATURE: 98.2 F | SYSTOLIC BLOOD PRESSURE: 128 MMHG | HEART RATE: 84 BPM

## 2018-06-13 DIAGNOSIS — Z51.11 ENCOUNTER FOR ANTINEOPLASTIC CHEMOTHERAPY: ICD-10-CM

## 2018-06-13 PROCEDURE — 82962 GLUCOSE BLOOD TEST: CPT

## 2018-06-13 PROCEDURE — C1713: CPT

## 2018-06-13 PROCEDURE — 93005 ELECTROCARDIOGRAM TRACING: CPT

## 2018-06-13 PROCEDURE — 83735 ASSAY OF MAGNESIUM: CPT

## 2018-06-13 PROCEDURE — 94640 AIRWAY INHALATION TREATMENT: CPT

## 2018-06-13 PROCEDURE — 88307 TISSUE EXAM BY PATHOLOGIST: CPT

## 2018-06-13 PROCEDURE — 86901 BLOOD TYPING SEROLOGIC RH(D): CPT

## 2018-06-13 PROCEDURE — 88341 IMHCHEM/IMCYTCHM EA ADD ANTB: CPT

## 2018-06-13 PROCEDURE — 85730 THROMBOPLASTIN TIME PARTIAL: CPT

## 2018-06-13 PROCEDURE — 76390 MR SPECTROSCOPY: CPT

## 2018-06-13 PROCEDURE — 86900 BLOOD TYPING SEROLOGIC ABO: CPT

## 2018-06-13 PROCEDURE — 88342 IMHCHEM/IMCYTCHM 1ST ANTB: CPT

## 2018-06-13 PROCEDURE — 70552 MRI BRAIN STEM W/DYE: CPT

## 2018-06-13 PROCEDURE — C1889: CPT

## 2018-06-13 PROCEDURE — 80048 BASIC METABOLIC PNL TOTAL CA: CPT

## 2018-06-13 PROCEDURE — 97161 PT EVAL LOW COMPLEX 20 MIN: CPT

## 2018-06-13 PROCEDURE — A9585: CPT

## 2018-06-13 PROCEDURE — 85027 COMPLETE CBC AUTOMATED: CPT

## 2018-06-13 PROCEDURE — 97166 OT EVAL MOD COMPLEX 45 MIN: CPT

## 2018-06-13 PROCEDURE — 85610 PROTHROMBIN TIME: CPT

## 2018-06-13 PROCEDURE — 88360 TUMOR IMMUNOHISTOCHEM/MANUAL: CPT

## 2018-06-13 PROCEDURE — 86850 RBC ANTIBODY SCREEN: CPT

## 2018-06-13 PROCEDURE — 71045 X-RAY EXAM CHEST 1 VIEW: CPT

## 2018-06-13 PROCEDURE — 70450 CT HEAD/BRAIN W/O DYE: CPT

## 2018-06-13 PROCEDURE — 84100 ASSAY OF PHOSPHORUS: CPT

## 2018-06-13 PROCEDURE — 99215 OFFICE O/P EST HI 40 MIN: CPT

## 2018-06-13 PROCEDURE — C1769: CPT

## 2018-06-13 PROCEDURE — 80053 COMPREHEN METABOLIC PANEL: CPT

## 2018-06-13 PROCEDURE — 93970 EXTREMITY STUDY: CPT

## 2018-06-13 PROCEDURE — 70553 MRI BRAIN STEM W/O & W/DYE: CPT

## 2018-06-18 ENCOUNTER — OTHER (OUTPATIENT)
Age: 63
End: 2018-06-18

## 2018-06-26 ENCOUNTER — APPOINTMENT (OUTPATIENT)
Dept: INFUSION THERAPY | Facility: HOSPITAL | Age: 63
End: 2018-06-26

## 2018-07-16 ENCOUNTER — RX RENEWAL (OUTPATIENT)
Age: 63
End: 2018-07-16

## 2018-07-22 PROBLEM — R51 HEADACHE, UNSPECIFIED HEADACHE TYPE: Status: ACTIVE | Noted: 2018-02-10

## 2018-08-15 ENCOUNTER — APPOINTMENT (OUTPATIENT)
Dept: NEUROLOGY | Facility: CLINIC | Age: 63
End: 2018-08-15
Payer: COMMERCIAL

## 2018-08-15 ENCOUNTER — OUTPATIENT (OUTPATIENT)
Dept: OUTPATIENT SERVICES | Facility: HOSPITAL | Age: 63
LOS: 1 days | End: 2018-08-15
Payer: COMMERCIAL

## 2018-08-15 ENCOUNTER — APPOINTMENT (OUTPATIENT)
Dept: MRI IMAGING | Facility: IMAGING CENTER | Age: 63
End: 2018-08-15
Payer: COMMERCIAL

## 2018-08-15 VITALS
OXYGEN SATURATION: 98 % | BODY MASS INDEX: 34.93 KG/M2 | WEIGHT: 185 LBS | HEART RATE: 80 BPM | SYSTOLIC BLOOD PRESSURE: 108 MMHG | HEIGHT: 61 IN | DIASTOLIC BLOOD PRESSURE: 72 MMHG | RESPIRATION RATE: 18 BRPM | TEMPERATURE: 98.8 F

## 2018-08-15 DIAGNOSIS — Z00.00 ENCOUNTER FOR GENERAL ADULT MEDICAL EXAMINATION W/OUT ABNORMAL FINDINGS: ICD-10-CM

## 2018-08-15 DIAGNOSIS — Z00.8 ENCOUNTER FOR OTHER GENERAL EXAMINATION: ICD-10-CM

## 2018-08-15 DIAGNOSIS — Z98.891 HISTORY OF UTERINE SCAR FROM PREVIOUS SURGERY: Chronic | ICD-10-CM

## 2018-08-15 PROBLEM — J45.909 UNSPECIFIED ASTHMA, UNCOMPLICATED: Chronic | Status: ACTIVE | Noted: 2017-07-24

## 2018-08-15 PROCEDURE — 82565 ASSAY OF CREATININE: CPT

## 2018-08-15 PROCEDURE — 70553 MRI BRAIN STEM W/O & W/DYE: CPT

## 2018-08-15 PROCEDURE — 99214 OFFICE O/P EST MOD 30 MIN: CPT

## 2018-08-15 PROCEDURE — A9585: CPT

## 2018-08-15 PROCEDURE — 70553 MRI BRAIN STEM W/O & W/DYE: CPT | Mod: 26

## 2018-08-15 RX ORDER — AMOXICILLIN 500 MG/1
500 CAPSULE ORAL
Qty: 16 | Refills: 0 | Status: DISCONTINUED | COMMUNITY
Start: 2018-06-09 | End: 2018-08-15

## 2018-08-15 RX ORDER — BEVACIZUMAB 400 MG/16ML
INJECTION, SOLUTION INTRAVENOUS
Refills: 0 | Status: DISCONTINUED | COMMUNITY
End: 2018-08-15

## 2018-08-15 RX ORDER — OXYCODONE AND ACETAMINOPHEN 5; 325 MG/1; MG/1
5-325 TABLET ORAL
Qty: 30 | Refills: 0 | Status: DISCONTINUED | COMMUNITY
Start: 2018-02-10 | End: 2018-08-15

## 2018-08-15 RX ORDER — NITROFURANTOIN (MONOHYDRATE/MACROCRYSTALS) 25; 75 MG/1; MG/1
100 CAPSULE ORAL
Qty: 14 | Refills: 0 | Status: DISCONTINUED | COMMUNITY
Start: 2018-06-18 | End: 2018-08-15

## 2018-08-20 ENCOUNTER — OUTPATIENT (OUTPATIENT)
Dept: OUTPATIENT SERVICES | Facility: HOSPITAL | Age: 63
LOS: 1 days | Discharge: HOME | End: 2018-08-20

## 2018-08-20 DIAGNOSIS — C71.9 MALIGNANT NEOPLASM OF BRAIN, UNSPECIFIED: ICD-10-CM

## 2018-08-20 DIAGNOSIS — Z98.891 HISTORY OF UTERINE SCAR FROM PREVIOUS SURGERY: Chronic | ICD-10-CM

## 2018-08-22 ENCOUNTER — OTHER (OUTPATIENT)
Age: 63
End: 2018-08-22

## 2018-08-23 ENCOUNTER — RX RENEWAL (OUTPATIENT)
Age: 63
End: 2018-08-23

## 2018-08-27 ENCOUNTER — APPOINTMENT (OUTPATIENT)
Dept: NEUROLOGY | Facility: CLINIC | Age: 63
End: 2018-08-27

## 2018-08-28 ENCOUNTER — APPOINTMENT (OUTPATIENT)
Dept: INTERNAL MEDICINE | Facility: CLINIC | Age: 63
End: 2018-08-28
Payer: COMMERCIAL

## 2018-08-28 VITALS
OXYGEN SATURATION: 96 % | RESPIRATION RATE: 18 BRPM | SYSTOLIC BLOOD PRESSURE: 126 MMHG | TEMPERATURE: 98.3 F | HEART RATE: 116 BPM | HEIGHT: 61 IN | WEIGHT: 185 LBS | DIASTOLIC BLOOD PRESSURE: 86 MMHG | BODY MASS INDEX: 34.93 KG/M2

## 2018-08-28 DIAGNOSIS — I10 ESSENTIAL (PRIMARY) HYPERTENSION: ICD-10-CM

## 2018-08-28 DIAGNOSIS — J45.909 UNSPECIFIED ASTHMA, UNCOMPLICATED: ICD-10-CM

## 2018-08-28 DIAGNOSIS — F41.9 ANXIETY DISORDER, UNSPECIFIED: ICD-10-CM

## 2018-08-28 PROCEDURE — 99214 OFFICE O/P EST MOD 30 MIN: CPT

## 2018-08-28 RX ORDER — ALPRAZOLAM 0.5 MG/1
0.5 TABLET ORAL
Qty: 90 | Refills: 0 | Status: ACTIVE | COMMUNITY
Start: 2017-11-28 | End: 1900-01-01

## 2018-08-28 RX ORDER — ZOLPIDEM TARTRATE 5 MG/1
5 TABLET ORAL
Qty: 30 | Refills: 1 | Status: ACTIVE | COMMUNITY
Start: 2018-08-28 | End: 1900-01-01

## 2018-08-28 RX ORDER — OLMESARTAN MEDOXOMIL 20 MG/1
20 TABLET, FILM COATED ORAL
Qty: 90 | Refills: 1 | Status: ACTIVE | COMMUNITY
Start: 2018-04-28 | End: 1900-01-01

## 2018-08-28 NOTE — HISTORY OF PRESENT ILLNESS
[de-identified] : Here for follow-up. Feeling OK. Latest Brain MRI stable.\par Rerquests med renewals.

## 2018-09-10 ENCOUNTER — APPOINTMENT (OUTPATIENT)
Dept: HEMATOLOGY ONCOLOGY | Facility: CLINIC | Age: 63
End: 2018-09-10
Payer: COMMERCIAL

## 2018-09-10 VITALS
HEART RATE: 77 BPM | RESPIRATION RATE: 16 BRPM | BODY MASS INDEX: 36.57 KG/M2 | SYSTOLIC BLOOD PRESSURE: 113 MMHG | TEMPERATURE: 98.5 F | OXYGEN SATURATION: 95 % | DIASTOLIC BLOOD PRESSURE: 79 MMHG | HEIGHT: 61.42 IN | WEIGHT: 196.21 LBS

## 2018-09-10 DIAGNOSIS — R41.3 OTHER AMNESIA: ICD-10-CM

## 2018-09-10 DIAGNOSIS — C71.9 MALIGNANT NEOPLASM OF BRAIN, UNSPECIFIED: ICD-10-CM

## 2018-09-10 DIAGNOSIS — R26.81 UNSTEADINESS ON FEET: ICD-10-CM

## 2018-09-10 PROCEDURE — 99205 OFFICE O/P NEW HI 60 MIN: CPT

## 2018-09-12 ENCOUNTER — APPOINTMENT (OUTPATIENT)
Dept: NEUROSURGERY | Facility: CLINIC | Age: 63
End: 2018-09-12

## 2018-09-12 PROBLEM — C71.9 GLIOBLASTOMA MULTIFORME OF BRAIN: Status: ACTIVE | Noted: 2017-08-02

## 2018-09-12 PROBLEM — R26.81 UNSTEADY GAIT: Status: ACTIVE | Noted: 2017-07-24

## 2018-09-12 PROBLEM — R41.3 MEMORY DIFFICULTY: Status: ACTIVE | Noted: 2017-07-24

## 2018-10-11 ENCOUNTER — OUTPATIENT (OUTPATIENT)
Dept: OUTPATIENT SERVICES | Facility: HOSPITAL | Age: 63
LOS: 1 days | Discharge: HOME | End: 2018-10-11

## 2018-10-11 DIAGNOSIS — C71.9 MALIGNANT NEOPLASM OF BRAIN, UNSPECIFIED: ICD-10-CM

## 2018-10-11 DIAGNOSIS — Z98.891 HISTORY OF UTERINE SCAR FROM PREVIOUS SURGERY: Chronic | ICD-10-CM

## 2018-10-17 ENCOUNTER — APPOINTMENT (OUTPATIENT)
Dept: COLORECTAL SURGERY | Facility: CLINIC | Age: 63
End: 2018-10-17
Payer: COMMERCIAL

## 2018-10-17 VITALS
OXYGEN SATURATION: 95 % | HEART RATE: 76 BPM | BODY MASS INDEX: 35.87 KG/M2 | HEIGHT: 61 IN | DIASTOLIC BLOOD PRESSURE: 75 MMHG | RESPIRATION RATE: 16 BRPM | WEIGHT: 190 LBS | SYSTOLIC BLOOD PRESSURE: 112 MMHG

## 2018-10-17 DIAGNOSIS — K62.89 OTHER SPECIFIED DISEASES OF ANUS AND RECTUM: ICD-10-CM

## 2018-10-17 PROCEDURE — 99243 OFF/OP CNSLTJ NEW/EST LOW 30: CPT | Mod: 25

## 2018-10-17 PROCEDURE — 46600 DIAGNOSTIC ANOSCOPY SPX: CPT

## 2018-10-17 RX ORDER — DEXAMETHASONE 0.5 MG/.5MG
0.5 TABLET ORAL
Refills: 0 | Status: ACTIVE | COMMUNITY

## 2018-10-17 RX ORDER — ZOLPIDEM TARTRATE 5 MG/1
5 TABLET ORAL
Qty: 30 | Refills: 2 | Status: DISCONTINUED | COMMUNITY
Start: 2017-08-22 | End: 2018-10-17

## 2018-10-17 RX ORDER — OXYCODONE HYDROCHLORIDE 5 MG/1
5 CAPSULE ORAL
Refills: 0 | Status: ACTIVE | COMMUNITY

## 2018-10-18 ENCOUNTER — OUTPATIENT (OUTPATIENT)
Dept: OUTPATIENT SERVICES | Facility: HOSPITAL | Age: 63
LOS: 1 days | End: 2018-10-18
Payer: COMMERCIAL

## 2018-10-18 ENCOUNTER — APPOINTMENT (OUTPATIENT)
Dept: MRI IMAGING | Facility: IMAGING CENTER | Age: 63
End: 2018-10-18
Payer: COMMERCIAL

## 2018-10-18 ENCOUNTER — APPOINTMENT (OUTPATIENT)
Dept: NEUROLOGY | Facility: CLINIC | Age: 63
End: 2018-10-18
Payer: COMMERCIAL

## 2018-10-18 ENCOUNTER — APPOINTMENT (OUTPATIENT)
Dept: HEMATOLOGY ONCOLOGY | Facility: CLINIC | Age: 63
End: 2018-10-18
Payer: COMMERCIAL

## 2018-10-18 VITALS
WEIGHT: 190.7 LBS | RESPIRATION RATE: 17 BRPM | HEART RATE: 78 BPM | OXYGEN SATURATION: 99 % | DIASTOLIC BLOOD PRESSURE: 81 MMHG | SYSTOLIC BLOOD PRESSURE: 127 MMHG | BODY MASS INDEX: 36.03 KG/M2 | TEMPERATURE: 98.1 F

## 2018-10-18 VITALS
TEMPERATURE: 97.7 F | DIASTOLIC BLOOD PRESSURE: 84 MMHG | OXYGEN SATURATION: 95 % | SYSTOLIC BLOOD PRESSURE: 125 MMHG | HEART RATE: 70 BPM | RESPIRATION RATE: 18 BRPM | BODY MASS INDEX: 35.87 KG/M2 | WEIGHT: 190 LBS | HEIGHT: 61 IN

## 2018-10-18 DIAGNOSIS — Z00.8 ENCOUNTER FOR OTHER GENERAL EXAMINATION: ICD-10-CM

## 2018-10-18 DIAGNOSIS — Z98.891 HISTORY OF UTERINE SCAR FROM PREVIOUS SURGERY: Chronic | ICD-10-CM

## 2018-10-18 DIAGNOSIS — Z79.899 OTHER LONG TERM (CURRENT) DRUG THERAPY: ICD-10-CM

## 2018-10-18 DIAGNOSIS — T45.1X5A NAUSEA WITH VOMITING, UNSPECIFIED: ICD-10-CM

## 2018-10-18 DIAGNOSIS — R25.1 TREMOR, UNSPECIFIED: ICD-10-CM

## 2018-10-18 DIAGNOSIS — R27.9 UNSPECIFIED LACK OF COORDINATION: ICD-10-CM

## 2018-10-18 DIAGNOSIS — R11.2 NAUSEA WITH VOMITING, UNSPECIFIED: ICD-10-CM

## 2018-10-18 PROCEDURE — 99214 OFFICE O/P EST MOD 30 MIN: CPT

## 2018-10-18 PROCEDURE — 70553 MRI BRAIN STEM W/O & W/DYE: CPT

## 2018-10-18 PROCEDURE — A9585: CPT

## 2018-10-18 PROCEDURE — 70553 MRI BRAIN STEM W/O & W/DYE: CPT | Mod: 26

## 2018-10-18 PROCEDURE — 82565 ASSAY OF CREATININE: CPT

## 2018-10-19 PROBLEM — R25.1 TREMORS OF NERVOUS SYSTEM: Status: ACTIVE | Noted: 2017-07-24

## 2018-10-19 PROBLEM — R11.2 CHEMOTHERAPY-INDUCED NAUSEA AND VOMITING: Status: ACTIVE | Noted: 2017-08-14

## 2018-10-19 PROBLEM — R27.9 COORDINATION DISORDER: Status: ACTIVE | Noted: 2017-07-24

## 2018-10-22 ENCOUNTER — OUTPATIENT (OUTPATIENT)
Dept: OUTPATIENT SERVICES | Facility: HOSPITAL | Age: 63
LOS: 1 days | End: 2018-10-22
Payer: COMMERCIAL

## 2018-10-22 VITALS
OXYGEN SATURATION: 97 % | RESPIRATION RATE: 18 BRPM | TEMPERATURE: 98 F | HEIGHT: 61 IN | DIASTOLIC BLOOD PRESSURE: 78 MMHG | HEART RATE: 87 BPM | WEIGHT: 190.04 LBS | SYSTOLIC BLOOD PRESSURE: 117 MMHG

## 2018-10-22 DIAGNOSIS — M25.839 OTHER SPECIFIED JOINT DISORDERS, UNSPECIFIED WRIST: Chronic | ICD-10-CM

## 2018-10-22 DIAGNOSIS — K64.8 OTHER HEMORRHOIDS: ICD-10-CM

## 2018-10-22 DIAGNOSIS — Z01.818 ENCOUNTER FOR OTHER PREPROCEDURAL EXAMINATION: ICD-10-CM

## 2018-10-22 DIAGNOSIS — C71.9 MALIGNANT NEOPLASM OF BRAIN, UNSPECIFIED: ICD-10-CM

## 2018-10-22 DIAGNOSIS — Z98.890 OTHER SPECIFIED POSTPROCEDURAL STATES: Chronic | ICD-10-CM

## 2018-10-22 DIAGNOSIS — Z98.891 HISTORY OF UTERINE SCAR FROM PREVIOUS SURGERY: Chronic | ICD-10-CM

## 2018-10-22 DIAGNOSIS — J98.9 RESPIRATORY DISORDER, UNSPECIFIED: ICD-10-CM

## 2018-10-22 DIAGNOSIS — Z90.89 ACQUIRED ABSENCE OF OTHER ORGANS: Chronic | ICD-10-CM

## 2018-10-22 PROCEDURE — 80048 BASIC METABOLIC PNL TOTAL CA: CPT

## 2018-10-22 PROCEDURE — 85027 COMPLETE CBC AUTOMATED: CPT

## 2018-10-22 PROCEDURE — G0463: CPT

## 2018-10-22 RX ORDER — LIDOCAINE HCL 20 MG/ML
0.2 VIAL (ML) INJECTION ONCE
Qty: 0 | Refills: 0 | Status: DISCONTINUED | OUTPATIENT
Start: 2018-10-29 | End: 2018-11-13

## 2018-10-22 RX ORDER — FLUTICASONE PROPIONATE AND SALMETEROL 50; 250 UG/1; UG/1
0 POWDER ORAL; RESPIRATORY (INHALATION)
Qty: 0 | Refills: 0 | COMMUNITY

## 2018-10-22 RX ORDER — FLUOXETINE HCL 10 MG
0 CAPSULE ORAL
Qty: 0 | Refills: 0 | COMMUNITY

## 2018-10-22 RX ORDER — SODIUM CHLORIDE 9 MG/ML
3 INJECTION INTRAMUSCULAR; INTRAVENOUS; SUBCUTANEOUS EVERY 8 HOURS
Qty: 0 | Refills: 0 | Status: DISCONTINUED | OUTPATIENT
Start: 2018-10-29 | End: 2018-11-13

## 2018-10-22 NOTE — H&P PST ADULT - PSH
H/O laminectomy  fusion L4 L5  History of craniotomy  glioblastoma 2017  History of tonsillectomy  1963  Impingement syndrome of wrist  Rt. tendon release   S/P   1975  S/P rotator cuff repair  Rt 2006
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
## 2018-10-22 NOTE — H&P PST ADULT - HISTORY OF PRESENT ILLNESS
63yr old female with hemorrhoids coming in for  hemorrhoidectomy . Pt hx sig for glioblastoma two surgeries  7/2017 and 11/2017 had radiation and chemo avastin last dose 6/18

## 2018-10-22 NOTE — H&P PST ADULT - NSANTHOSAYNRD_GEN_A_CORE
No. BRANDO screening performed.  STOP BANG Legend: 0-2 = LOW Risk; 3-4 = INTERMEDIATE Risk; 5-8 = HIGH Risk

## 2018-10-28 ENCOUNTER — TRANSCRIPTION ENCOUNTER (OUTPATIENT)
Age: 63
End: 2018-10-28

## 2018-10-29 ENCOUNTER — RESULT REVIEW (OUTPATIENT)
Age: 63
End: 2018-10-29

## 2018-10-29 ENCOUNTER — APPOINTMENT (OUTPATIENT)
Dept: COLORECTAL SURGERY | Facility: HOSPITAL | Age: 63
End: 2018-10-29

## 2018-10-29 ENCOUNTER — OUTPATIENT (OUTPATIENT)
Dept: OUTPATIENT SERVICES | Facility: HOSPITAL | Age: 63
LOS: 1 days | End: 2018-10-29
Payer: COMMERCIAL

## 2018-10-29 VITALS
WEIGHT: 190.04 LBS | OXYGEN SATURATION: 97 % | SYSTOLIC BLOOD PRESSURE: 131 MMHG | HEIGHT: 61 IN | DIASTOLIC BLOOD PRESSURE: 78 MMHG | RESPIRATION RATE: 18 BRPM | TEMPERATURE: 98 F | HEART RATE: 87 BPM

## 2018-10-29 VITALS
HEART RATE: 82 BPM | DIASTOLIC BLOOD PRESSURE: 65 MMHG | OXYGEN SATURATION: 98 % | TEMPERATURE: 98 F | RESPIRATION RATE: 20 BRPM | SYSTOLIC BLOOD PRESSURE: 124 MMHG

## 2018-10-29 DIAGNOSIS — Z98.890 OTHER SPECIFIED POSTPROCEDURAL STATES: Chronic | ICD-10-CM

## 2018-10-29 DIAGNOSIS — M25.839 OTHER SPECIFIED JOINT DISORDERS, UNSPECIFIED WRIST: Chronic | ICD-10-CM

## 2018-10-29 DIAGNOSIS — K64.8 OTHER HEMORRHOIDS: ICD-10-CM

## 2018-10-29 DIAGNOSIS — Z90.89 ACQUIRED ABSENCE OF OTHER ORGANS: Chronic | ICD-10-CM

## 2018-10-29 DIAGNOSIS — Z98.891 HISTORY OF UTERINE SCAR FROM PREVIOUS SURGERY: Chronic | ICD-10-CM

## 2018-10-29 DIAGNOSIS — Z01.818 ENCOUNTER FOR OTHER PREPROCEDURAL EXAMINATION: ICD-10-CM

## 2018-10-29 PROCEDURE — 46230 REMOVAL OF ANAL TAGS: CPT | Mod: 59

## 2018-10-29 PROCEDURE — 46260 REMOVE IN/EX HEM GROUPS 2+: CPT

## 2018-10-29 PROCEDURE — 88304 TISSUE EXAM BY PATHOLOGIST: CPT | Mod: 26

## 2018-10-29 PROCEDURE — 46250 REMOVE EXT HEM GROUPS 2+: CPT

## 2018-10-29 PROCEDURE — 46230 REMOVAL OF ANAL TAGS: CPT

## 2018-10-29 PROCEDURE — 88304 TISSUE EXAM BY PATHOLOGIST: CPT

## 2018-10-29 PROCEDURE — C1889: CPT

## 2018-10-29 RX ORDER — AMLODIPINE BESYLATE 2.5 MG/1
1 TABLET ORAL
Qty: 0 | Refills: 0 | COMMUNITY

## 2018-10-29 RX ORDER — FLUOXETINE HCL 10 MG
0 CAPSULE ORAL
Qty: 0 | Refills: 0 | COMMUNITY

## 2018-10-29 RX ORDER — OXYCODONE HYDROCHLORIDE 5 MG/1
5 TABLET ORAL ONCE
Qty: 0 | Refills: 0 | Status: DISCONTINUED | OUTPATIENT
Start: 2018-10-29 | End: 2018-10-29

## 2018-10-29 RX ORDER — ONDANSETRON 8 MG/1
4 TABLET, FILM COATED ORAL ONCE
Qty: 0 | Refills: 0 | Status: DISCONTINUED | OUTPATIENT
Start: 2018-10-29 | End: 2018-11-13

## 2018-10-29 RX ORDER — ACETAMINOPHEN 500 MG
1000 TABLET ORAL ONCE
Qty: 0 | Refills: 0 | Status: COMPLETED | OUTPATIENT
Start: 2018-10-29 | End: 2018-10-29

## 2018-10-29 RX ORDER — HYDROMORPHONE HYDROCHLORIDE 2 MG/ML
0.25 INJECTION INTRAMUSCULAR; INTRAVENOUS; SUBCUTANEOUS
Qty: 0 | Refills: 0 | Status: DISCONTINUED | OUTPATIENT
Start: 2018-10-29 | End: 2018-10-29

## 2018-10-29 RX ORDER — SODIUM CHLORIDE 9 MG/ML
1000 INJECTION, SOLUTION INTRAVENOUS
Qty: 0 | Refills: 0 | Status: DISCONTINUED | OUTPATIENT
Start: 2018-10-29 | End: 2018-11-13

## 2018-10-29 RX ORDER — CELECOXIB 200 MG/1
200 CAPSULE ORAL ONCE
Qty: 0 | Refills: 0 | Status: COMPLETED | OUTPATIENT
Start: 2018-10-29 | End: 2018-10-29

## 2018-10-29 RX ORDER — FLUTICASONE PROPIONATE AND SALMETEROL 50; 250 UG/1; UG/1
1 POWDER ORAL; RESPIRATORY (INHALATION)
Qty: 0 | Refills: 0 | COMMUNITY

## 2018-10-29 RX ORDER — CELECOXIB 200 MG/1
200 CAPSULE ORAL ONCE
Qty: 0 | Refills: 0 | Status: DISCONTINUED | OUTPATIENT
Start: 2018-10-29 | End: 2018-11-13

## 2018-10-29 RX ORDER — OXYCODONE HYDROCHLORIDE 5 MG/1
1 TABLET ORAL
Qty: 20 | Refills: 0 | OUTPATIENT
Start: 2018-10-29

## 2018-10-29 RX ADMIN — CELECOXIB 200 MILLIGRAM(S): 200 CAPSULE ORAL at 09:22

## 2018-10-29 RX ADMIN — Medication 1000 MILLIGRAM(S): at 09:22

## 2018-10-29 NOTE — BRIEF OPERATIVE NOTE - PROCEDURE
Hemorrhoidectomy involving two or more anal columns  10/29/2018    Active  EBIANCHI <<-----Click on this checkbox to enter Procedure
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
## 2018-10-29 NOTE — ASU DISCHARGE PLAN (ADULT/PEDIATRIC). - SPECIAL INSTRUCTIONS
- Once your local anesthetic wears off, start taking scheduled Tylenol (500mg) and Ibuprofen (400-600mg). You can alternate between them and take them together or separate every 4hrs.   - Only take the narcotic if severe pain.  - Daily fiber (Metamucil)  - Take Miralax if no bowel movement within 24hrs after surgery.

## 2018-10-29 NOTE — ASU DISCHARGE PLAN (ADULT/PEDIATRIC). - MEDICATION SUMMARY - MEDICATIONS TO TAKE
I will START or STAY ON the medications listed below when I get home from the hospital:    Naprelan 750 oral tablet, extended release  -- 1 tab(s) by mouth once a day, As Needed  -- Indication: For home med    oxyCODONE 5 mg oral tablet  -- 1 tab(s) by mouth every 6 hours, As Needed -Moderate Pain (4 - 6) - for severe pain MDD:4  -- Indication: For for pain after surgery    levETIRAcetam 500 mg oral tablet  -- 1 tab(s) by mouth every 12 hours  -- Indication: For home med    FLUoxetine 20 mg oral capsule  -- orally 2 times a day  -- Indication: For home med    zolpidem 5 mg oral tablet  -- 1 tab(s) by mouth once a day (at bedtime), As Needed  -- Indication: For home med    Advair Diskus 250 mcg-50 mcg inhalation powder  -- 1 puff(s) inhaled 2 times a day  -- Indication: For home med    amLODIPine 5 mg oral tablet  -- 1 tab(s) by mouth once a day  -- Indication: For home med

## 2018-10-29 NOTE — ASU DISCHARGE PLAN (ADULT/PEDIATRIC). - NOTIFY
Unable to Urinate/Bleeding that does not stop/Inability to Tolerate Liquids or Foods/Pain not relieved by Medications/Fever greater than 101/Excessive Diarrhea
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
## 2018-11-01 PROBLEM — K64.9 UNSPECIFIED HEMORRHOIDS: Chronic | Status: ACTIVE | Noted: 2018-10-22

## 2018-11-01 PROBLEM — C71.9 MALIGNANT NEOPLASM OF BRAIN, UNSPECIFIED: Chronic | Status: ACTIVE | Noted: 2018-10-22

## 2018-11-01 PROBLEM — I10 ESSENTIAL (PRIMARY) HYPERTENSION: Chronic | Status: ACTIVE | Noted: 2018-10-22

## 2018-11-01 PROBLEM — R26.81 UNSTEADINESS ON FEET: Chronic | Status: ACTIVE | Noted: 2018-10-22

## 2018-11-01 PROBLEM — R56.9 UNSPECIFIED CONVULSIONS: Chronic | Status: ACTIVE | Noted: 2018-10-22

## 2018-11-01 PROBLEM — F41.9 ANXIETY DISORDER, UNSPECIFIED: Chronic | Status: ACTIVE | Noted: 2018-10-22

## 2018-11-01 PROBLEM — T07.XXXA UNSPECIFIED MULTIPLE INJURIES, INITIAL ENCOUNTER: Chronic | Status: ACTIVE | Noted: 2018-10-22

## 2018-11-14 ENCOUNTER — APPOINTMENT (OUTPATIENT)
Dept: COLORECTAL SURGERY | Facility: CLINIC | Age: 63
End: 2018-11-14
Payer: COMMERCIAL

## 2018-11-14 VITALS
HEART RATE: 86 BPM | SYSTOLIC BLOOD PRESSURE: 134 MMHG | RESPIRATION RATE: 16 BRPM | TEMPERATURE: 98.4 F | OXYGEN SATURATION: 96 % | DIASTOLIC BLOOD PRESSURE: 88 MMHG

## 2018-11-14 DIAGNOSIS — K64.4 RESIDUAL HEMORRHOIDAL SKIN TAGS: ICD-10-CM

## 2018-11-14 PROCEDURE — 99024 POSTOP FOLLOW-UP VISIT: CPT

## 2018-12-05 ENCOUNTER — APPOINTMENT (OUTPATIENT)
Dept: COLORECTAL SURGERY | Facility: CLINIC | Age: 63
End: 2018-12-05

## 2018-12-13 ENCOUNTER — APPOINTMENT (OUTPATIENT)
Dept: NEUROLOGY | Facility: CLINIC | Age: 63
End: 2018-12-13

## 2018-12-13 ENCOUNTER — APPOINTMENT (OUTPATIENT)
Dept: MRI IMAGING | Facility: IMAGING CENTER | Age: 63
End: 2018-12-13

## 2018-12-27 ENCOUNTER — RX RENEWAL (OUTPATIENT)
Age: 63
End: 2018-12-27

## 2018-12-27 RX ORDER — LEVETIRACETAM 500 MG/1
500 TABLET, FILM COATED ORAL
Qty: 180 | Refills: 0 | Status: ACTIVE | COMMUNITY
Start: 2017-08-08 | End: 1900-01-01

## 2020-06-25 NOTE — ED PROVIDER NOTE - ENMT, MLM
Airway patent, Nasal mucosa clear. Mouth with normal mucosa. Throat has no vesicles, no oropharyngeal exudates and uvula is midline. Propranolol Counseling:  I discussed with the patient the risks of propranolol including but not limited to low heart rate, low blood pressure, low blood sugar, restlessness and increased cold sensitivity. They should call the office if they experience any of these side effects.

## 2020-12-16 PROBLEM — Z87.440 HISTORY OF URINARY TRACT INFECTION: Status: RESOLVED | Noted: 2018-04-07 | Resolved: 2020-12-16

## 2022-10-06 NOTE — ASU DISCHARGE PLAN (ADULT/PEDIATRIC). - MODE OF TRANSPORTATION
Caller: Annemarie Reynolds    Relationship: Emergency Contact    Best call back number: [phone]    Requested Prescriptions:   Requested Prescriptions     Pending Prescriptions Disp Refills   • oxyCODONE-acetaminophen (PERCOCET) 7.5-325 MG per tablet 120 tablet 0     Sig: Take 1 tablet by mouth Every 6 (Six) Hours As Needed for Moderate Pain.        Pharm[facility] where request should be sent: [facility] [place], [place] - [address]  - [phone] [facility] [phone] FX     Does the patient have less than a 3 day supply:  [x] Yes  [] No    Valente Porter Rep   10/06/22 16:28 EDT        Ambulatory

## 2023-10-01 PROBLEM — Z79.899 MEDICAL MARIJUANA USE: Status: ACTIVE | Noted: 2018-10-19

## 2024-02-23 NOTE — ED ADULT NURSE NOTE - CAS EDP DISCH DISPOSITION ADMI
Anxiety     Depression     GERD (gastroesophageal reflux disease)     HPV (human papilloma virus) anogenital infection     Hyperlipidemia     Insomnia     Premature ovarian failure           Allergies:    Patient has no known allergies.         Current Home Medications:    Current Outpatient Medications   Medication Instructions    atorvastatin (LIPITOR) 40 mg, Oral, Nightly    estradiol (ESTRACE) 2 g, Vaginal, DAILY    FLUoxetine (PROZAC) 20 mg, Oral, DAILY    LORazepam (ATIVAN) 1 mg, Oral, 3 TIMES DAILY PRN    mirtazapine (REMERON) 45 mg, Oral, NIGHTLY    omeprazole (PRILOSEC) 20 mg, Oral, DAILY    prazosin (MINIPRESS) 1 MG capsule Take 1 capsule at night for one week; may increase by 1 mg/week as tolerated, but not to exceed 3 mg nightly         PDMP:  Last reviewed: 11/1/23    06/22/2023 03/10/2023 1   Lorazepam 1 Mg Tablet  90.00 30 Mi Tra [id number] [facility] (0397) 2/2 3.00 LME Comm Ins [place]  05/27/2023 03/10/2023 1   Temazepam 30 Mg Capsule  30.00 30 Mi Tra [id number] [facility] (0397) 2/2 1.50 [place]  05/01/2023 03/10/2023 1   Lorazepam 1 Mg Tablet  90.00 30 Mi Tra [id number] [facility] (0397) 1/2 3.00 [place]    - I have checked the [place] PDMP website prior to prescribing a controlled substance.        Review of systems    Constitutional: denies significant weight loss/gain, fatigue    HEENT: denies rhinorrhea, sore throat.    Respiratory: denies cough, shortness of breath    Cardiovascular: denies chest pain    GI: denies N/V/C/D, abdominal pain.    MSK: denies joint pain, muscle stiffness/soreness, back pain, neck pain.    Neuro: denies HA, dizziness.    Psychiatric: as above and below.         Vital Signs:    Temp Readings from Last 3 Encounters:   02/23/24 98.9 °F (37.2 °C)   02/12/24 98.6 °F (37 °C)   12/12/23 98 °F (36.7 °C)       BP Readings from Last 3 Encounters:   02/23/24 130/84   02/12/24 120/86   12/12/23 120/82       Pulse Readings from Last 3 Encounters:   02/23/24 99   02/12/24 98   12/12/23 82          Lab 
Neuro/MedSurg